# Patient Record
Sex: FEMALE | Race: WHITE | URBAN - NONMETROPOLITAN AREA
[De-identification: names, ages, dates, MRNs, and addresses within clinical notes are randomized per-mention and may not be internally consistent; named-entity substitution may affect disease eponyms.]

---
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
## 2019-05-06 LAB
AVERAGE GLUCOSE: NORMAL
HBA1C MFR BLD: 8.6 %

## 2019-08-05 LAB
AVERAGE GLUCOSE: NORMAL
HBA1C MFR BLD: 8.2 %

## 2019-12-27 LAB
AVERAGE GLUCOSE: NORMAL
CREATININE URINE: 153 MG/DL
HBA1C MFR BLD: 8.4 %
MICROALBUMIN/CREAT 24H UR: 49 MG/G{CREAT}

## 2019-12-30 LAB
CHOLESTEROL, TOTAL: 198 MG/DL
CHOLESTEROL/HDL RATIO: 3.1
HDLC SERPL-MCNC: 64 MG/DL (ref 35–70)
LDL CHOLESTEROL CALCULATED: 120 MG/DL (ref 0–160)
NONHDLC SERPL-MCNC: 134 MG/DL
TRIGL SERPL-MCNC: 50 MG/DL
VLDLC SERPL CALC-MCNC: NORMAL MG/DL

## 2020-02-07 LAB
AVERAGE GLUCOSE: NORMAL
HBA1C MFR BLD: 8.8 %

## 2020-02-11 LAB — DIABETIC RETINOPATHY: NEGATIVE

## 2020-08-17 LAB
AVERAGE GLUCOSE: NORMAL
HBA1C MFR BLD: 7.2 %

## 2020-12-30 LAB
AVERAGE GLUCOSE: NORMAL
HBA1C MFR BLD: 7 %

## 2021-07-01 ENCOUNTER — OFFICE VISIT (OUTPATIENT)
Dept: DIABETES SERVICES | Age: 21
End: 2021-07-01
Payer: COMMERCIAL

## 2021-07-01 VITALS
SYSTOLIC BLOOD PRESSURE: 98 MMHG | HEART RATE: 94 BPM | WEIGHT: 154.8 LBS | BODY MASS INDEX: 23.46 KG/M2 | DIASTOLIC BLOOD PRESSURE: 68 MMHG | HEIGHT: 68 IN | RESPIRATION RATE: 16 BRPM

## 2021-07-01 DIAGNOSIS — E10.9 TYPE 1 DIABETES MELLITUS WITHOUT COMPLICATION (HCC): Primary | ICD-10-CM

## 2021-07-01 PROCEDURE — 99205 OFFICE O/P NEW HI 60 MIN: CPT | Performed by: NURSE PRACTITIONER

## 2021-07-01 PROCEDURE — 95251 CONT GLUC MNTR ANALYSIS I&R: CPT | Performed by: NURSE PRACTITIONER

## 2021-07-01 RX ORDER — INSULIN PUMP CONTROLLER
EACH MISCELLANEOUS
Qty: 6 EACH | Refills: 3 | Status: SHIPPED | OUTPATIENT
Start: 2021-07-01 | End: 2022-01-10 | Stop reason: SDUPTHER

## 2021-07-01 ASSESSMENT — ENCOUNTER SYMPTOMS
RESPIRATORY NEGATIVE: 1
ABDOMINAL PAIN: 0
SHORTNESS OF BREATH: 0
DIARRHEA: 0

## 2021-07-01 NOTE — PROGRESS NOTES
medications for this visit. Review ofSymptoms:  Review of Systems   Constitutional: Positive for fatigue. Negative for unexpected weight change. Eyes: Negative for visual disturbance. Respiratory: Negative. Negative for shortness of breath. Cardiovascular: Negative for chest pain and leg swelling. Gastrointestinal: Negative for abdominal pain and diarrhea. Endocrine: Negative for polydipsia, polyphagia and polyuria. Genitourinary: Negative. Musculoskeletal: Negative. Skin: Negative for rash and wound. Neurological: Negative for dizziness, tremors, seizures and headaches. Psychiatric/Behavioral: Negative. Negative for confusion and decreased concentration. Theremainder of a complete 14-point review of systems is negative. Vital Signs: BP 98/68 (Site: Left Upper Arm, Position: Sitting, Cuff Size: Large Adult)   Pulse 94   Resp 16   Ht 5' 8\" (1.727 m)   Wt 154 lb 12.8 oz (70.2 kg)   LMP 06/17/2021   Breastfeeding No   BMI 23.54 kg/m²      Wt Readings from Last 3 Encounters:   07/01/21 154 lb 12.8 oz (70.2 kg)     Body mass index is 23.54 kg/m². LABS:  No results found for: LABA1C  No results found for: LABMICR, [id number]  No results found for: NA, K, CL, CO2, BUN, CREATININE, GLUCOSE, CALCIUM, PROT, LABALBU, BILITOT, ALKPHOS, AST, ALT, LABGLOM, GFRAA, AGRATIO, GLOB  No results found for: CHOL  No results found for: TRIG  No results found for: HDL  No results found for: LDLCHOLESTEROL, LDLCALC  No results found for: LABVLDL, VLDL  No results found for: CHOLHDLRATIO        Physical Exam  Constitutional:       Appearance: She is well-developed. Eyes:      Pupils: Pupils are equal, round, and reactive to light. Neck:      Thyroid: No thyroid mass, thyromegaly or thyroid tenderness. Cardiovascular:      Rate and Rhythm: Normal rate and regular rhythm. Heart sounds: Normal heart sounds.    Pulmonary:      Effort: Pulmonary effort is normal.      Breath sounds: Normal breath sounds. Abdominal:      General: Bowel sounds are normal.      Palpations: Abdomen is soft. Skin:     General: Skin is warm and dry. Comments: Negative for open/nonhealing wounds. Negative for lipohypertrophy. Neurological:      Mental Status: She is alert and oriented to person, place, and time. ASSESSMENT/PLAN:     Diagnosis Orders   1. Type 1 diabetes mellitus without complication (HCC)  Vitamin D 25 Hydroxy    Comprehensive Metabolic Panel    CBC Auto Differential    Hemoglobin A1C    Lipid Panel    Microalbumin, Ur    Lipid, Fasting     Orders Placed This Encounter   Procedures    Vitamin D 25 Hydroxy    Comprehensive Metabolic Panel    CBC Auto Differential    Hemoglobin A1C    Lipid Panel    Microalbumin, Ur    Lipid, Fasting     Orders Placed This Encounter   Medications    insulin aspart (NOVOLOG) 100 UNIT/ML injection vial     Sig: Per omnipod     Dispense:  60 mL     Refill:  3    Insulin Disposable Pump (OMNIPOD DASH 5 PACK PODS) MISC     Sig: For use with omnipod     Dispense:  6 each     Refill:  3     Requested Prescriptions     Signed Prescriptions Disp Refills    insulin aspart (NOVOLOG) 100 UNIT/ML injection vial 60 mL 3     Sig: Per omnipod    Insulin Disposable Pump (OMNIPOD DASH 5 PACK PODS) MISC 6 each 3     Sig: For use with omnipod       1. Type 1 diabetes mellitus without complication (HCC)  - Unstable  HbA1C goal is less than 7%. - Fasting blood glucose goal is 70-130mg/dl and postprandial blood sugar goal is less than 180 mg/dl. -Diabetic foot exam up-to-date: No  -Diabetic retinal exam up-to-date: Yes  - Labs reviewed includes: Most recent A1C 7.0%, Microalb/Crt. Ratio 0 mcg/mg creat and GFR >60 mL/min. Repeat labs due in 2 weeks.    -We discussed in great detail dietary modifications they can make to better improve their blood sugars. --Initial diabetic education completed.  Discussed diabetes as a disease and how we can manage it to prevent complications associated with it. Insulin pump settings downloaded and reviewed. Scanned into media tab. CGM report downloaded and reviewed, scanned to media tab. Time in range 7.6% and hypoglycemia 3%. Predicted A1c per CGM report 7.6%. -she will work on compliance with her meal bolus   Insulin Instructions  Pump Settings   insulin aspart 100 UNIT/ML injection vial (NOVOLOG)   Last edited by DESMOND Tirado - CNP on 7/1/2021 at 10:47 AM      Basal Rate   Total Basal Dose: 20.1 units/day   Time units/hr   12:00 AM 0.8   11:00 AM 0.9    8:00 PM 0.8      Blood Glucose Target   Time mg/dL   12:00  - 100      Sensitivity Factor   Time mg/dL/unit   12:00 AM 50      Carb Ratio   Time g/unit   12:00 AM 9       Patient Instructions   Primary care doc   Gyn   Look into medicaid   Upgrade to dash   Use bolus function   Labs due now         Discussed signs and symptoms of hyper/hypoglycemia and how to treat. Encouraged 150 minutes of physical activity per week. Follow a low carbohydrate diet. Encouraged at least 7 hours of sleep. The patient was informed of the goals of diabetes management. This can only be accomplished by watching their diet and exercise levels. We certainly use medicines to help attain these goals. The consequences of not controlling blood sugars were discussed. These include blindness, heart disease, stroke, kidney disease, and possibly need for dialysis. They were told to be careful with their foot care as diabetics often have nerve damage, infections and risk for limb amutations . They also need a dilated eye exam yearly. We discussed the issues of diet, exercise, medication, complication avoidance, reviewed the signs and symptoms of diabetes, hypoglycemic episodes, significance of HbA1C.       - Vitamin D 25 Hydroxy; Future  - Comprehensive Metabolic Panel; Future  - CBC Auto Differential; Future  - Hemoglobin A1C; Future  - Lipid Panel;  Future  - Microalbumin, Ur; Future  - Lipid, Fasting; Future        Answered all patient questions. Agrees to follow plan of care and to follow up in 1 months, sooner if needed. Call office if unexplained blood sugars less than 70 occur or above 400. Call office or access MyChart with any further questions or concerns. Be sure to bring glucometer/food log at next appointment. Total time spent reviewing chart, labs, counseling patient and documenting on the date of the encounter: 60 min.       Electronically signed by DESMOND Spencer CNP on 7/1/2021 at 10:53 AM      (Please note that portions of this note were completed with a voice-recognition program. Efforts were made to edit the dictation but occasionally words are mis-transcribed.)

## 2021-07-30 ENCOUNTER — TELEPHONE (OUTPATIENT)
Dept: INTERNAL MEDICINE | Age: 21
End: 2021-07-30

## 2021-07-30 NOTE — TELEPHONE ENCOUNTER
Patient wanted documented as just an FYI, she is rescheduling appointment to push out one more month as she is finalizing moving and finishing up old insurance to new insurance.

## 2021-08-16 ENCOUNTER — TELEPHONE (OUTPATIENT)
Dept: INTERNAL MEDICINE | Age: 21
End: 2021-08-16

## 2021-08-16 NOTE — TELEPHONE ENCOUNTER
Spoke with patient and she states she would like to know if we can write a letter stating that it would be benefitial for her to have a support animal that can alert her when her blood sugars are going to high or too low. She is also going to see if her landlord has paperwork for us to fill out and if so she will fax it over to us. If agreeable, I will type up for patient and let her know when it is ready. Thank you.

## 2021-08-17 NOTE — TELEPHONE ENCOUNTER
I would be more than happy to compose a letter for therapy dog but this must be a licensed and trained dog with proper paper for documentation.

## 2021-08-17 NOTE — TELEPHONE ENCOUNTER
Patient thanked [address] and will get back with us if that situation does happen.  At this time, she does not have this animal.

## 2021-09-08 ENCOUNTER — HOSPITAL ENCOUNTER (OUTPATIENT)
Dept: LAB | Age: 21
Discharge: HOME | End: 2021-09-08
Payer: COMMERCIAL

## 2021-09-08 DIAGNOSIS — E10.9 TYPE 1 DIABETES MELLITUS WITHOUT COMPLICATION (HCC): ICD-10-CM

## 2021-09-08 LAB
ABSOLUTE EOS #: 0.1 K/UL (ref 0–0.44)
ABSOLUTE IMMATURE GRANULOCYTE: <0.03 K/UL (ref 0–0.3)
ABSOLUTE LYMPH #: 2.47 K/UL (ref 1.1–3.7)
ABSOLUTE MONO #: 0.32 K/UL (ref 0.1–1.2)
ALBUMIN SERPL-MCNC: 4.5 G/DL (ref 3.5–5.2)
ALBUMIN/GLOBULIN RATIO: 1.7 (ref 1–2.5)
ALP BLD-CCNC: 19 U/L (ref 35–104)
ALT SERPL-CCNC: 15 U/L (ref 5–33)
ANION GAP SERPL CALCULATED.3IONS-SCNC: 12 MMOL/L (ref 9–17)
AST SERPL-CCNC: 20 U/L
BASOPHILS # BLD: 1 % (ref 0–2)
BASOPHILS ABSOLUTE: 0.04 K/UL (ref 0–0.2)
BILIRUB SERPL-MCNC: 0.4 MG/DL (ref 0.3–1.2)
BUN BLDV-MCNC: 16 MG/DL (ref 6–20)
BUN/CREAT BLD: 24 (ref 9–20)
CALCIUM SERPL-MCNC: 9.2 MG/DL (ref 8.6–10.4)
CHLORIDE BLD-SCNC: 102 MMOL/L (ref 98–107)
CHOLESTEROL/HDL RATIO: 2.6
CHOLESTEROL: 199 MG/DL
CO2: 24 MMOL/L (ref 20–31)
CREAT SERPL-MCNC: 0.66 MG/DL (ref 0.5–0.9)
CREATININE URINE: 172.2 MG/DL (ref 28–217)
DIFFERENTIAL TYPE: ABNORMAL
EOSINOPHILS RELATIVE PERCENT: 2 % (ref 1–4)
ESTIMATED AVERAGE GLUCOSE: 177 MG/DL
GFR AFRICAN AMERICAN: >60 ML/MIN
GFR NON-AFRICAN AMERICAN: >60 ML/MIN
GFR SERPL CREATININE-BSD FRML MDRD: ABNORMAL ML/MIN/{1.73_M2}
GFR SERPL CREATININE-BSD FRML MDRD: ABNORMAL ML/MIN/{1.73_M2}
GLUCOSE BLD-MCNC: 81 MG/DL (ref 70–99)
HBA1C MFR BLD: 7.8 % (ref 4–6)
HCT VFR BLD CALC: 39.2 % (ref 36.3–47.1)
HDLC SERPL-MCNC: 76 MG/DL
HEMOGLOBIN: 13.4 G/DL (ref 11.9–15.1)
IMMATURE GRANULOCYTES: 0 %
LDL CHOLESTEROL: 113 MG/DL (ref 0–130)
LYMPHOCYTES # BLD: 51 % (ref 24–43)
MCH RBC QN AUTO: 31.2 PG (ref 25.2–33.5)
MCHC RBC AUTO-ENTMCNC: 34.2 G/DL (ref 25.2–33.5)
MCV RBC AUTO: 91.4 FL (ref 82.6–102.9)
MICROALBUMIN/CREAT 24H UR: 29 MG/L
MICROALBUMIN/CREAT UR-RTO: 17 MCG/MG CREAT
MONOCYTES # BLD: 7 % (ref 3–12)
NRBC AUTOMATED: 0 PER 100 WBC
PDW BLD-RTO: 11.5 % (ref 11.8–14.4)
PLATELET # BLD: 171 K/UL (ref 138–453)
PLATELET ESTIMATE: ABNORMAL
PMV BLD AUTO: 10.4 FL (ref 8.1–13.5)
POTASSIUM SERPL-SCNC: 4.2 MMOL/L (ref 3.7–5.3)
RBC # BLD: 4.29 M/UL (ref 3.95–5.11)
RBC # BLD: ABNORMAL 10*6/UL
SEG NEUTROPHILS: 39 % (ref 36–65)
SEGMENTED NEUTROPHILS ABSOLUTE COUNT: 1.88 K/UL (ref 1.5–8.1)
SODIUM BLD-SCNC: 138 MMOL/L (ref 135–144)
TOTAL PROTEIN: 7.2 G/DL (ref 6.4–8.3)
TRIGL SERPL-MCNC: 50 MG/DL
VITAMIN D 25-HYDROXY: 26.8 NG/ML (ref 30–100)
VLDLC SERPL CALC-MCNC: NORMAL MG/DL (ref 1–30)
WBC # BLD: 4.8 K/UL (ref 3.5–11.3)
WBC # BLD: ABNORMAL 10*3/UL

## 2021-09-08 PROCEDURE — 82043 UR ALBUMIN QUANTITATIVE: CPT

## 2021-09-08 PROCEDURE — 82306 VITAMIN D 25 HYDROXY: CPT

## 2021-09-08 PROCEDURE — 80053 COMPREHEN METABOLIC PANEL: CPT

## 2021-09-08 PROCEDURE — 80061 LIPID PANEL: CPT

## 2021-09-08 PROCEDURE — 36415 COLL VENOUS BLD VENIPUNCTURE: CPT

## 2021-09-08 PROCEDURE — 82570 ASSAY OF URINE CREATININE: CPT

## 2021-09-08 PROCEDURE — 85025 COMPLETE CBC W/AUTO DIFF WBC: CPT

## 2021-09-08 PROCEDURE — 83036 HEMOGLOBIN GLYCOSYLATED A1C: CPT

## 2021-09-09 ENCOUNTER — OFFICE VISIT (OUTPATIENT)
Dept: DIABETES SERVICES | Age: 21
End: 2021-09-09
Payer: COMMERCIAL

## 2021-09-09 VITALS
DIASTOLIC BLOOD PRESSURE: 88 MMHG | SYSTOLIC BLOOD PRESSURE: 128 MMHG | WEIGHT: 156 LBS | BODY MASS INDEX: 23.64 KG/M2 | RESPIRATION RATE: 14 BRPM | HEART RATE: 78 BPM | HEIGHT: 68 IN

## 2021-09-09 DIAGNOSIS — E10.9 TYPE 1 DIABETES MELLITUS WITHOUT COMPLICATION (HCC): Primary | ICD-10-CM

## 2021-09-09 PROCEDURE — 95251 CONT GLUC MNTR ANALYSIS I&R: CPT | Performed by: NURSE PRACTITIONER

## 2021-09-09 PROCEDURE — 99214 OFFICE O/P EST MOD 30 MIN: CPT | Performed by: NURSE PRACTITIONER

## 2021-09-09 PROCEDURE — 3051F HG A1C>EQUAL 7.0%<8.0%: CPT | Performed by: NURSE PRACTITIONER

## 2021-09-09 ASSESSMENT — ENCOUNTER SYMPTOMS
RESPIRATORY NEGATIVE: 1
SHORTNESS OF BREATH: 0
DIARRHEA: 0
ABDOMINAL PAIN: 0

## 2021-09-09 NOTE — PROGRESS NOTES
[facility] [address]  [facility]  [address] [facility], [address]  [place] [address] [facility]  [phone]        HISTORY:    Dwayne Estes presents today for evaluation and management of:  Chief Complaint   Patient presents with    Diabetes     2 month appt. HPI    Interval History:    Past DM Medications   MDIx1 year      Current Diabetic Medications  novolog for use with omnipod max daily dose 35 units.      DKA episodes: 0    07/01/21   She was previously seeing peds kristal in [place] and has moved to [facility] and needs to get established. She was diagnosed in 2014. She had polyuria, polydipsia weight loss did not have DKA started on MDI then to ompipod a year later with dexcom cgm. She does currently have the newest omnipod dash system but has not changed over yet. She needs refills on supplies and labs. most recent a1c done in December of 2020 7.0%. Denies any s/s of hyper/hypoglycemia. She does have a emergency glucagon kit at home has never needed to use it. Diet: counting carbs not very well  Exercise: walking  BS testing: Uses CGM daily with success and is adherent to cgm regimen. Issues: denies         09/09/21   At previous visit dm counseling was provided. She has upgraded to omnipod dash system. She has had labs completed. Diet: counting carbs   Exercise: walking  BS testing: Uses CGM daily with success and is adherent to cgm regimen.    Issues: denies            Past Medical History:   Diagnosis Date    Diabetes mellitus ([address]      Family History   Problem Relation Age of Onset    High Blood Pressure Paternal Grandmother     High Blood Pressure Paternal Grandfather      Social History     Tobacco Use    Smoking status: Never Smoker    Smokeless tobacco: Never Used   Vaping Use    Vaping Use: Never used   Substance Use Topics    Alcohol use: Never    Drug use: Never     No Known Allergies    MEDICATIONS:  Current Outpatient Medications   Medication Sig Dispense Refill    insulin aspart (NOVOLOG) 100 UNIT/ML injection vial Per omnipod 60 mL 3    Insulin Disposable Pump (OMNIPOD DASH 5 PACK PODS) MISC For use with omnipod 6 each 3    glucagon 1 MG injection Glucagon Emergency Kit 1 mg solution for injection (Patient not taking: Reported on 9/9/2021)       No current facility-administered medications for this visit. Review ofSymptoms:  Review of Systems   Constitutional: Positive for fatigue. Negative for unexpected weight change. Eyes: Negative for visual disturbance. Respiratory: Negative. Negative for shortness of breath. Cardiovascular: Negative for chest pain and leg swelling. Gastrointestinal: Negative for abdominal pain and diarrhea. Endocrine: Negative for polydipsia, polyphagia and polyuria. Genitourinary: Negative. Musculoskeletal: Negative. Skin: Negative for rash and wound. Neurological: Negative for dizziness, tremors, seizures and headaches. Psychiatric/Behavioral: Negative. Negative for confusion and decreased concentration. Theremainder of a complete 14-point review of systems is negative. Vital Signs: /88 (Site: Left Upper Arm, Position: Sitting, Cuff Size: Medium Adult)   Pulse 78   Resp 14   Ht 5' 8\" (1.727 m)   Wt 156 lb (70.8 kg)   LMP 09/05/2021   BMI 23.72 kg/m²      Wt Readings from Last 3 Encounters:   09/09/21 156 lb (70.8 kg)   07/01/21 154 lb 12.8 oz (70.2 kg)     Body mass index is 23.72 kg/m².   LABS:  Hemoglobin A1C   Date Value Ref Range Status   09/08/2021 7.8 (H) 4.0 - 6.0 % Final   12/30/2020 7.0 % Final     Lab Results   Component Value Date    LABMICR 17 09/08/2021     Lab Results   Component Value Date     09/08/2021    K 4.2 09/08/2021     09/08/2021    CO2 24 09/08/2021    BUN 16 09/08/2021    CREATININE 0.66 09/08/2021    GLUCOSE 81 09/08/2021    CALCIUM 9.2 09/08/2021    PROT 7.2 09/08/2021    LABALBU 4.5 09/08/2021    BILITOT 0.40 09/08/2021    ALKPHOS 19 (L) 09/08/2021    AST 20 09/08/2021    ALT 15 09/08/2021    LABGLOM >60 09/08/2021    GFRAA >60 09/08/2021     Lab Results   Component Value Date    CHOL 199 09/08/2021    CHOL 198 12/30/2019     Lab Results   Component Value Date    TRIG 50 09/08/2021    TRIG 50 12/30/2019     Lab Results   Component Value Date    HDL 76 09/08/2021    HDL 64 12/30/2019     Lab Results   Component Value Date    LDLCHOLESTEROL 113 09/08/2021    LDLCALC 120 12/30/2019     Lab Results   Component Value Date    VLDL NOT REPORTED 09/08/2021     Lab Results   Component Value Date    CHOLHDLRATIO 2.6 09/08/2021    CHOLHDLRATIO 3.1 12/30/2019           Physical Exam  Constitutional:       Appearance: She is well-developed. Eyes:      Pupils: Pupils are equal, round, and reactive to light. Neck:      Thyroid: No thyroid mass, thyromegaly or thyroid tenderness. Cardiovascular:      Rate and Rhythm: Normal rate and regular rhythm. Heart sounds: Normal heart sounds. Pulmonary:      Effort: Pulmonary effort is normal.      Breath sounds: Normal breath sounds. Abdominal:      General: Bowel sounds are normal.      Palpations: Abdomen is soft. Skin:     General: Skin is warm and dry. Comments: Negative for open/nonhealing wounds. Negative for lipohypertrophy. Neurological:      Mental Status: She is alert and oriented to person, place, and time. Diabetic foot check:Normal strength and range of motion of toes, feet, and ankles bilaterally. No joint deformity. No cyanosis or clubbing. 100% sensation at all 22 test points with the 10 gram filament. Dorsalis pedis pulses intactbilaterally. Capillary refill at the toes was less than 2 seconds. Vibratory sensation (with 128 Hz tuning fork) intact bilaterally. Light touch sensation intact bilaterally. Hair growth present on feet and toes bilaterally. No skin breakdown, erythema, rub spots, blisters, scaling, or ulcers. No calluses or corns.  Toenails thin and not ingrown. No evidence of fungal infection. ASSESSMENT/PLAN:     Diagnosis Orders   1. Type 1 diabetes mellitus without complication (HCC)  HM DIABETES FOOT EXAM    Hemoglobin A1C     Orders Placed This Encounter   Procedures    Hemoglobin A1C    HM DIABETES FOOT EXAM     No orders of the defined types were placed in this encounter. Requested Prescriptions      No prescriptions requested or ordered in this encounter       1. Type 1 diabetes mellitus without complication (HCC)  - Unstable  HbA1C goal is less than 7%. - Fasting blood glucose goal is 70-130mg/dl and postprandial blood sugar goal is less than 180 mg/dl. -Diabetic foot exam up-to-date: Yes  -Diabetic retinal exam up-to-date: Yes  - Labs reviewed includes: Most recent A1C 7.8%, Microalb/Crt. Ratio 17 mcg/mg creat and GFR >60 mL/min. Repeat labs due in 3 months.    -We discussed in great detail dietary modifications they can make to better improve their blood sugars. -follow up diabetes education completed, all questions answered. CGM report downloaded and reviewed, scanned to media tab. Time in range 38% and hypoglycemia 3%. Insulin pump settings downloaded and reviewed. Scanned into media tab. -no pump changes at this time reviewed compliance and using bolus with every meal and for corrections. reviewed labs and suggest otc vitamin d. Insulin Instructions  Pump Settings   insulin aspart 100 UNIT/ML injection vial (NOVOLOG)   Last edited by DESMOND Rivera CNP on 9/9/2021 at 2:25 PM      Basal Rate   Total Basal Dose: 20.1 units/day   Time units/hr   12:00 AM 0.8   11:00 AM 0.9    8:00 PM 0.8      Blood Glucose Target   Time mg/dL   12:00  - 120      Sensitivity Factor   Time mg/dL/unit   12:00 AM 50      Carb Ratio   Time g/unit   12:00 AM 9           Discussed signs and symptoms of hyper/hypoglycemia and how to treat. Encouraged 150 minutes of physical activity per week. Follow a low carbohydrate diet. Encouraged at least 7 hours of sleep. The patient was informed of the goals of diabetes management. This can only be accomplished by watching their diet and exercise levels. We certainly use medicines to help attain these goals. The consequences of not controlling blood sugars were discussed. These include blindness, heart disease, stroke, kidney disease, and possibly need for dialysis. They were told to be careful with their foot care as diabetics often have nerve damage, infections and risk for limb amutations . They also need a dilated eye exam yearly. We discussed the issues of diet, exercise, medication, complication avoidance, reviewed the signs and symptoms of diabetes, hypoglycemic episodes, significance of HbA1C.       -  DIABETES FOOT EXAM  - Hemoglobin A1C; Future        Answered all patient questions. Agrees to follow plan of care and to follow up in 3 months, sooner if needed. Call office if unexplained blood sugars less than 70 occur or above 400. Call office or access MyChart with any further questions or concerns. Be sure to bring glucometer/food log at next appointment. Total time spent reviewing chart, labs, counseling patient and documenting on the date of the encounter: 30 min.       Electronically signed by DESMOND River CNP on 9/9/2021 at 2:18 PM      (Please note that portions of this note were completed with a voice-recognition program. Efforts were made to edit the dictation but occasionally words are mis-transcribed.)

## 2021-12-08 ENCOUNTER — OFFICE VISIT (OUTPATIENT)
Dept: DIABETES SERVICES | Age: 21
End: 2021-12-08
Payer: COMMERCIAL

## 2021-12-08 VITALS
RESPIRATION RATE: 14 BRPM | SYSTOLIC BLOOD PRESSURE: 92 MMHG | HEIGHT: 68 IN | BODY MASS INDEX: 24.71 KG/M2 | WEIGHT: 163 LBS | HEART RATE: 64 BPM | DIASTOLIC BLOOD PRESSURE: 62 MMHG

## 2021-12-08 DIAGNOSIS — R79.89 LOW SERUM VITAMIN D: ICD-10-CM

## 2021-12-08 DIAGNOSIS — E10.9 TYPE 1 DIABETES MELLITUS WITHOUT COMPLICATION (HCC): Primary | ICD-10-CM

## 2021-12-08 PROCEDURE — 3051F HG A1C>EQUAL 7.0%<8.0%: CPT | Performed by: NURSE PRACTITIONER

## 2021-12-08 PROCEDURE — 99214 OFFICE O/P EST MOD 30 MIN: CPT | Performed by: NURSE PRACTITIONER

## 2021-12-08 PROCEDURE — 95251 CONT GLUC MNTR ANALYSIS I&R: CPT | Performed by: NURSE PRACTITIONER

## 2021-12-09 ASSESSMENT — ENCOUNTER SYMPTOMS
ABDOMINAL PAIN: 0
SHORTNESS OF BREATH: 0
RESPIRATORY NEGATIVE: 1
DIARRHEA: 0

## 2021-12-09 NOTE — PROGRESS NOTES
[facility] [address] INTERNAL  [facility]  [address] [facility], [address]  [place] [address] [facility]  [phone]        HISTORY:    Adam Kaufman presents today for evaluation and management of:  Chief Complaint   Patient presents with    Diabetes     3 month appt. HPI    Interval History:    Past DM Medications   MDIx1 year      Current Diabetic Medications  novolog for use with omnipod max daily dose 35 units.      DKA episodes: 0    07/01/21   She was previously seeing jose ramon giraldo in [place] and has moved to [facility] and needs to get established. She was diagnosed in 2014. She had polyuria, polydipsia weight loss did not have DKA started on MDI then to ompipod a year later with dexcom cgm. She does currently have the newest omnipod dash system but has not changed over yet. She needs refills on supplies and labs. most recent a1c done in December of 2020 7.0%.  Denies any s/s of hyper/hypoglycemia. She does have a emergency glucagon kit at home has never needed to use it. Diet: counting carbs not very well  Exercise: walking  BS testing: Uses CGM daily with success and is adherent to cgm regimen.   Issues: denies          09/09/21   At previous visit dm counseling was provided. She has upgraded to omnipod dash system. She has had labs completed. Diet: counting carbs   Exercise: walking  BS testing: Uses CGM daily with success and is adherent to cgm regimen.   Issues: denies      12/09/21   Adam Kaufman is a 24 y.o. female patient who presents to clinic today for follow up on her diabetes. At previous visit diabetes counseling was provided. she denies any signs or symptoms of hyper/hypoglycemia. she states they are taking their medications as prescribed without any adverse effects. She does admit to eating without taking a bolus or bolusing after meals.   She does have a history of low serum vitamin D level was recommended to restart over-the-counter vitamin D supplementation however she has not done this. Diet: Counting carbohydrates  Exercise: walking  BS testing: uses cgm daily with success and is adherent to cgm therapy  Issues: petra  Diabetic foot exam up-to-date: Yes  Diabetic retinal exam up-to-date: Yes  Hypoglycemia as needed treatment: Glucose tabs and emergency glucagon        Past Medical History:   Diagnosis Date    Diabetes mellitus ([address]      Family History   Problem Relation Age of Onset    High Blood Pressure Paternal Grandmother     High Blood Pressure Paternal Grandfather      Social History     Tobacco Use    Smoking status: Never Smoker    Smokeless tobacco: Never Used   Vaping Use    Vaping Use: Never used   Substance Use Topics    Alcohol use: Never    Drug use: Never     No Known Allergies    MEDICATIONS:  Current Outpatient Medications   Medication Sig Dispense Refill    insulin aspart (NOVOLOG) 100 UNIT/ML injection vial Per omnipod 60 mL 3    Insulin Disposable Pump (OMNIPOD DASH 5 PACK PODS) MISC For use with omnipod 6 each 3    glucagon 1 MG injection Glucagon Emergency Kit 1 mg solution for injection (Patient not taking: Reported on 9/9/2021)       No current facility-administered medications for this visit. Review ofSymptoms:  Review of Systems   Constitutional: Positive for fatigue. Negative for unexpected weight change. Eyes: Negative for visual disturbance. Respiratory: Negative. Negative for shortness of breath. Cardiovascular: Negative for chest pain and leg swelling. Gastrointestinal: Negative for abdominal pain and diarrhea. Endocrine: Negative for polydipsia, polyphagia and polyuria. Genitourinary: Negative. Musculoskeletal: Negative. Skin: Negative for rash and wound. Neurological: Negative for dizziness, tremors, seizures and headaches. Psychiatric/Behavioral: Negative. Negative for confusion and decreased concentration.      Theremainder of a complete 14-point review of systems is negative. Vital Signs: BP 92/62 (Site: Right Upper Arm, Position: Sitting, Cuff Size: Medium Adult)   Pulse 64   Resp 14   Ht 5' 8\" (1.727 m)   Wt 163 lb (73.9 kg)   BMI 24.78 kg/m²      Wt Readings from Last 3 Encounters:   12/08/21 163 lb (73.9 kg)   09/09/21 156 lb (70.8 kg)   07/01/21 154 lb 12.8 oz (70.2 kg)     Body mass index is 24.78 kg/m². LABS:  Hemoglobin A1C   Date Value Ref Range Status   09/08/2021 7.8 (H) 4.0 - 6.0 % Final   12/30/2020 7.0 % Final     Lab Results   Component Value Date    LABMICR 17 09/08/2021     Lab Results   Component Value Date     09/08/2021    K 4.2 09/08/2021     09/08/2021    CO2 24 09/08/2021    BUN 16 09/08/2021    CREATININE 0.66 09/08/2021    GLUCOSE 81 09/08/2021    CALCIUM 9.2 09/08/2021    PROT 7.2 09/08/2021    LABALBU 4.5 09/08/2021    BILITOT 0.40 09/08/2021    ALKPHOS 19 (L) 09/08/2021    AST 20 09/08/2021    ALT 15 09/08/2021    LABGLOM >60 09/08/2021    GFRAA >60 09/08/2021     Lab Results   Component Value Date    CHOL 199 09/08/2021    CHOL 198 12/30/2019     Lab Results   Component Value Date    TRIG 50 09/08/2021    TRIG 50 12/30/2019     Lab Results   Component Value Date    HDL 76 09/08/2021    HDL 64 12/30/2019     Lab Results   Component Value Date    LDLCHOLESTEROL 113 09/08/2021    LDLCALC 120 12/30/2019     Lab Results   Component Value Date    VLDL NOT REPORTED 09/08/2021     Lab Results   Component Value Date    CHOLHDLRATIO 2.6 09/08/2021    CHOLHDLRATIO 3.1 12/30/2019           Physical Exam  Constitutional:       Appearance: She is well-developed. Eyes:      Pupils: Pupils are equal, round, and reactive to light. Neck:      Thyroid: No thyroid mass, thyromegaly or thyroid tenderness. Cardiovascular:      Rate and Rhythm: Normal rate and regular rhythm. Heart sounds: Normal heart sounds. Pulmonary:      Effort: Pulmonary effort is normal.      Breath sounds: Normal breath sounds.    Abdominal: General: Bowel sounds are normal.      Palpations: Abdomen is soft. Skin:     General: Skin is warm and dry. Comments: Negative for open/nonhealing wounds. Negative for lipohypertrophy. Neurological:      Mental Status: She is alert and oriented to person, place, and time. ASSESSMENT/PLAN:     Diagnosis Orders   1. Type 1 diabetes mellitus without complication (HCC)  Basic Metabolic Panel    Hemoglobin A1C   2. Low serum vitamin D  Vitamin D 25 Hydroxy     Orders Placed This Encounter   Procedures    Vitamin D 25 Hydroxy    Basic Metabolic Panel    Hemoglobin A1C     No orders of the defined types were placed in this encounter. Requested Prescriptions      No prescriptions requested or ordered in this encounter       1. Type 1 diabetes mellitus without complication (HCC)  - Unstable  HbA1C goal is less than 7%. - Fasting blood glucose goal is 70-120mg/dl and postprandial blood sugar goal is less than 180 mg/dl. - Labs reviewed including most recent A1c, microalbumin and kidney function. Repeat labs due in 3 months.    -We discussed in great detail dietary modifications they can make to better improve their blood sugars. -follow up diabetes education completed, all questions answered. CGM report downloaded and reviewed, scanned to media tab. Time in range 34% and hypoglycemia 4%. Predicted A1c per CGM report 8.5%. -Discussed with patient the importance of using bolus function appropriately.  -She'll work on bolusing before meals and using recommended bolus dosing. We will make slight adjustment in basal setting see below.   Insulin Instructions  Pump Settings   insulin aspart 100 UNIT/ML injection vial (NOVOLOG)   Last edited by DESMOND Melo - CNP on 12/8/2021 at 5:44 PM      Basal Rate   Total Basal Dose: 21.6 units/day   Time units/hr   12:00 AM 0.9   11:00 AM 0.9    8:00 PM 0.9      Blood Glucose Target   Time mg/dL   12:00  - 120      Sensitivity Factor   Time mg/dL/unit   12:00 AM 50      Carb Ratio   Time g/unit   12:00 AM 9         Discussed signs and symptoms of hyper/hypoglycemia and how to treat. Encouraged 150 minutes of physical activity per week. Follow a low carbohydrate diet. Encouraged at least 7 hours of sleep. The patient was informed of the goals of diabetes management. This can only be accomplished by watching their diet and exercise levels. We certainly use medicines to help attain these goals. The consequences of not controlling blood sugars were discussed. These include blindness, heart disease, stroke, kidney disease, and possibly need for dialysis. They were told to be careful with their foot care as diabetics often have nerve damage, infections and risk for limb amutations . They also need a dilated eye exam yearly. We discussed the issues of diet, exercise, medication, complication avoidance, reviewed the signs and symptoms of diabetes, hypoglycemic episodes, significance of HbA1C.       - Basic Metabolic Panel; Future  - Hemoglobin A1C; Future    2. Low serum vitamin D  -start over-the-counter vitamin D supplement  - Vitamin D 25 Hydroxy; Future        Answered all patient questions. Agrees to follow plan of care and to follow up in 3 months, sooner if needed. Call office if unexplained blood sugars less than 70 occur or above 400. Call office or access MyChart with any further questions or concerns. Be sure to bring glucometer/food log at next appointment. Total time spent reviewing chart, labs, counseling patient and documenting on the date of the encounter: 30 min.       Electronically signed by DESMOND Yepez CNP on 12/9/2021 at 12:35 PM      (Please note that portions of this note were completed with a voice-recognition program. Efforts were made to edit the dictation but occasionally words are mis-transcribed.)

## 2022-01-10 RX ORDER — INSULIN PUMP CONTROLLER
EACH MISCELLANEOUS
Qty: 6 EACH | Refills: 3 | Status: SHIPPED | OUTPATIENT
Start: 2022-01-10 | End: 2022-04-10 | Stop reason: SDUPTHER

## 2022-03-02 ENCOUNTER — HOSPITAL ENCOUNTER (OUTPATIENT)
Dept: LAB | Age: 22
Discharge: HOME | End: 2022-03-02
Payer: COMMERCIAL

## 2022-03-02 DIAGNOSIS — R79.89 LOW SERUM VITAMIN D: ICD-10-CM

## 2022-03-02 DIAGNOSIS — E10.9 TYPE 1 DIABETES MELLITUS WITHOUT COMPLICATION (HCC): ICD-10-CM

## 2022-03-02 LAB
ANION GAP SERPL CALCULATED.3IONS-SCNC: 9 MMOL/L (ref 9–17)
BUN BLDV-MCNC: 11 MG/DL (ref 6–20)
BUN/CREAT BLD: 17 (ref 9–20)
CALCIUM SERPL-MCNC: 9.2 MG/DL (ref 8.6–10.4)
CHLORIDE BLD-SCNC: 103 MMOL/L (ref 98–107)
CHOLESTEROL, FASTING: 175 MG/DL
CHOLESTEROL/HDL RATIO: 2.2
CO2: 28 MMOL/L (ref 20–31)
CREAT SERPL-MCNC: 0.63 MG/DL (ref 0.5–0.9)
GFR AFRICAN AMERICAN: >60 ML/MIN
GFR NON-AFRICAN AMERICAN: >60 ML/MIN
GFR SERPL CREATININE-BSD FRML MDRD: NORMAL ML/MIN/{1.73_M2}
GLUCOSE BLD-MCNC: 70 MG/DL (ref 70–99)
HDLC SERPL-MCNC: 79 MG/DL
LDL CHOLESTEROL: 90 MG/DL (ref 0–130)
POTASSIUM SERPL-SCNC: 3.7 MMOL/L (ref 3.7–5.3)
SODIUM BLD-SCNC: 140 MMOL/L (ref 135–144)
TRIGLYCERIDE, FASTING: 32 MG/DL
VITAMIN D 25-HYDROXY: 22.9 NG/ML

## 2022-03-02 PROCEDURE — 83036 HEMOGLOBIN GLYCOSYLATED A1C: CPT

## 2022-03-02 PROCEDURE — 82306 VITAMIN D 25 HYDROXY: CPT

## 2022-03-02 PROCEDURE — 36415 COLL VENOUS BLD VENIPUNCTURE: CPT

## 2022-03-02 PROCEDURE — 80061 LIPID PANEL: CPT

## 2022-03-02 PROCEDURE — 80048 BASIC METABOLIC PNL TOTAL CA: CPT

## 2022-03-03 LAB
ESTIMATED AVERAGE GLUCOSE: 203 MG/DL
HBA1C MFR BLD: 8.7 % (ref 4–6)

## 2022-03-09 ENCOUNTER — OFFICE VISIT (OUTPATIENT)
Dept: DIABETES SERVICES | Age: 22
End: 2022-03-09
Payer: COMMERCIAL

## 2022-03-09 VITALS
DIASTOLIC BLOOD PRESSURE: 60 MMHG | WEIGHT: 162.4 LBS | SYSTOLIC BLOOD PRESSURE: 110 MMHG | BODY MASS INDEX: 24.61 KG/M2 | HEART RATE: 76 BPM | RESPIRATION RATE: 15 BRPM | HEIGHT: 68 IN

## 2022-03-09 DIAGNOSIS — E10.9 TYPE 1 DIABETES MELLITUS WITHOUT COMPLICATION (HCC): Primary | ICD-10-CM

## 2022-03-09 DIAGNOSIS — R79.89 LOW SERUM VITAMIN D: ICD-10-CM

## 2022-03-09 PROCEDURE — 3052F HG A1C>EQUAL 8.0%<EQUAL 9.0%: CPT | Performed by: NURSE PRACTITIONER

## 2022-03-09 PROCEDURE — 99214 OFFICE O/P EST MOD 30 MIN: CPT | Performed by: NURSE PRACTITIONER

## 2022-03-09 PROCEDURE — 99211 OFF/OP EST MAY X REQ PHY/QHP: CPT | Performed by: NURSE PRACTITIONER

## 2022-03-09 PROCEDURE — 95251 CONT GLUC MNTR ANALYSIS I&R: CPT | Performed by: NURSE PRACTITIONER

## 2022-03-09 ASSESSMENT — ENCOUNTER SYMPTOMS
DIARRHEA: 0
SHORTNESS OF BREATH: 0
RESPIRATORY NEGATIVE: 1
ABDOMINAL PAIN: 0

## 2022-03-09 NOTE — PROGRESS NOTES
[address] [facility] INTERNAL MED A DEPARTMENT OF [address]  [address] [facility], [address]  [facility] [address] [phone]        HISTORY:    Dom Parr presents today for evaluation and management of:  Chief Complaint   Patient presents with    Diabetes       HPI    Interval History:    Past DM Medications   MDIx1 year      Current Diabetic Medications  novolog for use with omnipod max daily dose 50 units.      DKA episodes: 0    07/01/21   She was previously seeing jose ramon giraldo in [place] and has moved to [facility] and needs to get established. She was diagnosed in 2014. She had polyuria, polydipsia weight loss did not have DKA started on MDI then to ompipod a year later with dexcom cgm. She does currently have the newest omnipod dash system but has not changed over yet. She needs refills on supplies and labs. most recent a1c done in December of 2020 7.0%.  Denies any s/s of hyper/hypoglycemia. She does have a emergency glucagon kit at home has never needed to use it. Diet: counting carbs not very well  Exercise: walking  BS testing: Uses CGM daily with success and is adherent to cgm regimen.   Issues: denies          09/09/21   At previous visit dm counseling was provided. She has upgraded to omnipod dash system. She has had labs completed.   Diet: counting carbs   Exercise: walking  BS testing: Uses CGM daily with success and is adherent to cgm regimen.   Issues: denies       12/09/21   Dom Parr is a 24 y.o. female patient who presents to clinic today for follow up on her diabetes. At previous visit diabetes counseling was provided. she denies any signs or symptoms of hyper/hypoglycemia. she states they are taking their medications as prescribed without any adverse effects. She does admit to eating without taking a bolus or bolusing after meals.   She does have a history of low serum vitamin D level was recommended to restart over-the-counter vitamin D supplementation however she has not done this. Diet: Counting carbohydrates  Exercise: walking  BS testing: uses cgm daily with success and is adherent to cgm therapy  Issues: petra  Diabetic foot exam up-to-date: Yes  Diabetic retinal exam up-to-date: Yes  Hypoglycemia as needed treatment: Glucose tabs and emergency glucagon    03/09/22   Mayra Staton is a 24 y.o. female patient who presents to clinic today for her diabetes. she has a history of missed doses which contributes to her diabetes. At previous visit diabetes counseling was provided. she denies any current signs or symptoms of hyper/hypoglycemia. she states they are taking their medications as prescribed without any adverse effects. Diet: Counting carbohydrates  Exercise: walking  BS testing: uses cgm daily with success and is adherent to cgm therapy  Issues: petra  Diabetic foot exam up-to-date: Yes  Diabetic retinal exam up-to-date: Yes  Hypoglycemia as needed treatment: Glucose tabs and emergency glucagon    Low vitamin D- she has not started vitamin d supplement yet. Past Medical History:   Diagnosis Date    Diabetes mellitus ([facility] [address]      Family History   Problem Relation Age of Onset    High Blood Pressure Paternal Grandmother     High Blood Pressure Paternal Grandfather      Social History     Tobacco Use    Smoking status: Never Smoker    Smokeless tobacco: Never Used   Vaping Use    Vaping Use: Never used   Substance Use Topics    Alcohol use: Never    Drug use: Never     No Known Allergies    MEDICATIONS:  Current Outpatient Medications   Medication Sig Dispense Refill    insulin aspart (NOVOLOG) 100 UNIT/ML injection vial Per omnipod 20 mL 3    Insulin Disposable Pump (OMNIPOD DASH 5 PACK PODS) MISC For use with omnipod 6 each 3    glucagon 1 MG injection Glucagon Emergency Kit 1 mg solution for injection       No current facility-administered medications for this visit.        Review ofSymptoms:  Review of Systems Constitutional: Positive for fatigue. Negative for unexpected weight change. Eyes: Negative for visual disturbance. Respiratory: Negative. Negative for shortness of breath. Cardiovascular: Negative for chest pain and leg swelling. Gastrointestinal: Negative for abdominal pain and diarrhea. Endocrine: Negative for polydipsia, polyphagia and polyuria. Genitourinary: Negative. Musculoskeletal: Negative. Skin: Negative for rash and wound. Neurological: Negative for dizziness, tremors, seizures and headaches. Psychiatric/Behavioral: Negative. Negative for confusion and decreased concentration. Theremainder of a complete 14-point review of systems is negative. Vital Signs: /60 (Site: Right Upper Arm, Position: Sitting, Cuff Size: Medium Adult)   Pulse 76   Resp 15   Ht 5' 8\" (1.727 m)   Wt 162 lb 6.4 oz (73.7 kg)   BMI 24.69 kg/m²      Wt Readings from Last 3 Encounters:   03/09/22 162 lb 6.4 oz (73.7 kg)   12/08/21 163 lb (73.9 kg)   09/09/21 156 lb (70.8 kg)     Body mass index is 24.69 kg/m².   LABS:  Hemoglobin A1C   Date Value Ref Range Status   03/02/2022 8.7 (H) 4.0 - 6.0 % Final   09/08/2021 7.8 (H) 4.0 - 6.0 % Final     Lab Results   Component Value Date    LABMICR 17 09/08/2021     Lab Results   Component Value Date     03/02/2022    K 3.7 03/02/2022     03/02/2022    CO2 28 03/02/2022    BUN 11 03/02/2022    CREATININE 0.63 03/02/2022    GLUCOSE 70 03/02/2022    CALCIUM 9.2 03/02/2022    PROT 7.2 09/08/2021    LABALBU 4.5 09/08/2021    BILITOT 0.40 09/08/2021    ALKPHOS 19 (L) 09/08/2021    AST 20 09/08/2021    ALT 15 09/08/2021    LABGLOM >60 03/02/2022    GFRAA >60 03/02/2022     Lab Results   Component Value Date    CHOL 199 09/08/2021    CHOL 198 12/30/2019     Lab Results   Component Value Date    TRIG 50 09/08/2021    TRIG 50 12/30/2019     Lab Results   Component Value Date    HDL 79 03/02/2022    HDL 76 09/08/2021    HDL 64 12/30/2019     Lab Results   Component Value Date    LDLCHOLESTEROL 90 03/02/2022    LDLCHOLESTEROL 113 09/08/2021    LDLCALC 120 12/30/2019     Lab Results   Component Value Date    VLDL NOT REPORTED 09/08/2021     Lab Results   Component Value Date    CHOLHDLRATIO 2.2 03/02/2022    CHOLHDLRATIO 2.6 09/08/2021    CHOLHDLRATIO 3.1 12/30/2019           Physical Exam  Constitutional:       Appearance: She is well-developed. Eyes:      Pupils: Pupils are equal, round, and reactive to light. Neck:      Thyroid: No thyroid mass, thyromegaly or thyroid tenderness. Cardiovascular:      Rate and Rhythm: Normal rate and regular rhythm. Heart sounds: Normal heart sounds. Pulmonary:      Effort: Pulmonary effort is normal.      Breath sounds: Normal breath sounds. Abdominal:      General: Bowel sounds are normal.      Palpations: Abdomen is soft. Skin:     General: Skin is warm and dry. Comments: Negative for open/nonhealing wounds. Negative for lipohypertrophy. Neurological:      Mental Status: She is alert and oriented to person, place, and time. ASSESSMENT/PLAN:     Diagnosis Orders   1. Type 1 diabetes mellitus without complication (HCC)  POCT glycosylated hemoglobin (Hb A1C)   2. Low serum vitamin D       Orders Placed This Encounter   Procedures    POCT glycosylated hemoglobin (Hb A1C)     Orders Placed This Encounter   Medications    insulin aspart (NOVOLOG) 100 UNIT/ML injection vial     Sig: Per omnipod     Dispense:  20 mL     Refill:  3     Requested Prescriptions     Signed Prescriptions Disp Refills    insulin aspart (NOVOLOG) 100 UNIT/ML injection vial 20 mL 3     Sig: Per omnipod       1. Type 1 diabetes mellitus without complication (HCC)  - Unstable  HbA1C goal is less than 7%. - Fasting blood glucose goal is 70-120mg/dl and postprandial blood sugar goal is less than 180 mg/dl. - Labs reviewed including most recent A1c, microalbumin and kidney function. Repeat labs due in 3 months. -We discussed in great detail dietary modifications they can make to better improve their blood sugars. -follow up diabetes education completed, all questions answered. CGM report downloaded and reviewed, scanned to media tab. Time in range 39% and hypoglycemia 0%. Predicted A1c per CGM report 8.2%. Insulin pump settings downloaded and reviewed. Scanned into media tab.   -basal adjusted, will enter in glucose with every bolus. Insulin Instructions  Pump Settings   insulin aspart 100 UNIT/ML injection vial (NOVOLOG)   Last edited by DESMOND Lazaro CNP on 3/9/2022 at 1:33 PM      Basal Rate   Total Basal Dose: 22.4 units/day   Time units/hr   12:00 AM 1    4:00 AM 0.9    8:00 PM 1      Blood Glucose Target   Time mg/dL   12:00  - 120      Sensitivity Factor   Time mg/dL/unit   12:00 AM 50      Carb Ratio   Time g/unit   12:00 AM 9         Discussed signs and symptoms of hyper/hypoglycemia and how to treat. Encouraged 150 minutes of physical activity per week. Follow a low carbohydrate diet. Encouraged at least 7 hours of sleep. The patient was informed of the goals of diabetes management. This can only be accomplished by watching their diet and exercise levels. We certainly use medicines to help attain these goals. The consequences of not controlling blood sugars were discussed. These include blindness, heart disease, stroke, kidney disease, and possibly need for dialysis. They were told to be careful with their foot care as diabetics often have nerve damage, infections and risk for limb amutations . They also need a dilated eye exam yearly. We discussed the issues of diet, exercise, medication, complication avoidance, reviewed the signs and symptoms of diabetes, hypoglycemic episodes, significance of HbA1C.       - POCT glycosylated hemoglobin (Hb A1C); Future    2. Low serum vitamin D  -OTC vitamin D supplement         Answered all patient questions.  Agrees to follow plan of care and to follow up in 3 months, sooner if needed. Call office if unexplained blood sugars less than 70 occur or above 400. Call office or access [email or web address] with any further questions or concerns. Be sure to bring glucometer/food log at next appointment. Total time spent reviewing chart, labs, counseling patient and documenting on the date of the encounter: 30 min.       Electronically signed by DESMOND Gonzalez CNP on 3/9/2022 at 1:36 PM      (Please note that portions of this note were completed with a voice-recognition program. Efforts were made to edit the dictation but occasionally words are mis-transcribed.)

## 2022-04-07 LAB — DIABETIC RETINOPATHY: NEGATIVE

## 2022-04-11 RX ORDER — INSULIN PUMP CONTROLLER
EACH MISCELLANEOUS
Qty: 6 EACH | Refills: 3 | Status: SHIPPED | OUTPATIENT
Start: 2022-04-11 | End: 2022-09-12 | Stop reason: ALTCHOICE

## 2022-06-08 ENCOUNTER — TELEPHONE (OUTPATIENT)
Dept: INTERNAL MEDICINE | Age: 22
End: 2022-06-08

## 2022-06-08 ENCOUNTER — OFFICE VISIT (OUTPATIENT)
Dept: DIABETES SERVICES | Age: 22
End: 2022-06-08
Payer: COMMERCIAL

## 2022-06-08 VITALS
DIASTOLIC BLOOD PRESSURE: 72 MMHG | RESPIRATION RATE: 14 BRPM | BODY MASS INDEX: 23.49 KG/M2 | HEIGHT: 68 IN | SYSTOLIC BLOOD PRESSURE: 102 MMHG | WEIGHT: 155 LBS | HEART RATE: 60 BPM

## 2022-06-08 DIAGNOSIS — E10.9 TYPE 1 DIABETES MELLITUS WITHOUT COMPLICATION (HCC): Primary | ICD-10-CM

## 2022-06-08 PROCEDURE — 99214 OFFICE O/P EST MOD 30 MIN: CPT | Performed by: NURSE PRACTITIONER

## 2022-06-08 PROCEDURE — 95251 CONT GLUC MNTR ANALYSIS I&R: CPT | Performed by: NURSE PRACTITIONER

## 2022-06-08 PROCEDURE — 3052F HG A1C>EQUAL 8.0%<EQUAL 9.0%: CPT | Performed by: NURSE PRACTITIONER

## 2022-06-08 RX ORDER — INSULIN PMP CART,AUT,G6/7,CNTR
1 EACH SUBCUTANEOUS
Qty: 1 KIT | Refills: 0 | Status: SHIPPED | OUTPATIENT
Start: 2022-06-08

## 2022-06-08 RX ORDER — INSULIN PMP CART,AUT,G6/7,CNTR
1 EACH SUBCUTANEOUS
Qty: 10 EACH | Refills: 5 | Status: SHIPPED | OUTPATIENT
Start: 2022-06-08 | End: 2022-09-12 | Stop reason: SDUPTHER

## 2022-06-08 RX ORDER — INSULIN ASPART 100 [IU]/ML
INJECTION, SOLUTION INTRAVENOUS; SUBCUTANEOUS
Qty: 20 ML | Refills: 3 | Status: SHIPPED | OUTPATIENT
Start: 2022-06-08 | End: 2022-08-10 | Stop reason: SDUPTHER

## 2022-06-08 RX ORDER — INSULIN ASPART 100 [IU]/ML
INJECTION, SOLUTION INTRAVENOUS; SUBCUTANEOUS
Qty: 20 ML | Refills: 3 | Status: SHIPPED | OUTPATIENT
Start: 2022-06-08 | End: 2022-06-08

## 2022-06-08 ASSESSMENT — PATIENT HEALTH QUESTIONNAIRE - PHQ9
SUM OF ALL RESPONSES TO PHQ QUESTIONS 1-9: 0
SUM OF ALL RESPONSES TO PHQ9 QUESTIONS 1 & 2: 0
SUM OF ALL RESPONSES TO PHQ QUESTIONS 1-9: 0
SUM OF ALL RESPONSES TO PHQ QUESTIONS 1-9: 0
1. LITTLE INTEREST OR PLEASURE IN DOING THINGS: 0
2. FEELING DOWN, DEPRESSED OR HOPELESS: 0
SUM OF ALL RESPONSES TO PHQ QUESTIONS 1-9: 0

## 2022-06-08 ASSESSMENT — ENCOUNTER SYMPTOMS
DIARRHEA: 0
SHORTNESS OF BREATH: 0
RESPIRATORY NEGATIVE: 1
ABDOMINAL PAIN: 0

## 2022-06-08 NOTE — TELEPHONE ENCOUNTER
CURLY vargas. Spoke with Brandon Lopez from [facility]. She stated that she is going to check benefits on patient and will be in contact with patient for any information. She will call/fax with what needed and once that has been completed with RX, they will move forward with process of approval on omnipod 5.

## 2022-06-08 NOTE — PROGRESS NOTES
[facility] P[place] [place] INTERNAL [facility] [address] [facility]  [address] [facility], [address]  [place] [address] [facility]  [phone]        HISTORY:    Everett Feliz presents today for evaluation and management of:  Chief Complaint   Patient presents with    Diabetes     3 month appt. Eye exam completed april 7th. HPI    Interval History:    Past DM Medications   MDIx1 year      Current Diabetic Medications  novolog for use with omnipod max daily dose 50 units.      DKA episodes: 0       03/09/22   Everett Feliz is a 24 y.o. female patient who presents to clinic today for her diabetes. she has a history of missed doses which contributes to her diabetes. At previous visit diabetes counseling was provided. she denies any current signs or symptoms of hyper/hypoglycemia. she states they are taking their medications as prescribed without any adverse effects. Diet: Counting carbohydrates  Exercise: walking  BS testing: uses cgm daily with success and is adherent to cgm therapy  Issues: deneis  Diabetic foot exam up-to-date: Yes  Diabetic retinal exam up-to-date: Yes  Hypoglycemia as needed treatment: Glucose tabs and emergency glucagon    06/08/22   Everett Feliz is a 16[address] [facility] y.o. female patient who presents to clinic today for her diabetes. she has a history of missed doses which contributes to her diabetes. At previous visit diabetes counseling was provided she denies any current signs or symptoms of hyper/hypoglycemia. she states they are taking their medications as prescribed without any adverse effects.    Diet: Counting carbohydrates  Exercise: walking  BS testing: uses cgm daily with success and is adherent to cgm therapy  Issues: deneis  Diabetic foot exam up-to-date: Yes  Diabetic retinal exam up-to-date: Yes  Hypoglycemia as needed treatment: Glucose tabs and emergency glucagon          Past Medical History:   Diagnosis Date    Diabetes mellitus ([address] Family History   Problem Relation Age of Onset    High Blood Pressure Paternal Grandmother     High Blood Pressure Paternal Grandfather      Social History     Tobacco Use    Smoking status: Never Smoker    Smokeless tobacco: Never Used   Vaping Use    Vaping Use: Never used   Substance Use Topics    Alcohol use: Never    Drug use: Never     No Known Allergies    MEDICATIONS:  Current Outpatient Medications   Medication Sig Dispense Refill    insulin aspart (NOVOLOG) 100 UNIT/ML injection vial For use with insulin pump max daily dose is 45 units 20 mL 3    Insulin Disposable Pump (OMNIPOD 5 G6 INTRO, GEN 5,) KIT 1 kit by Does not apply route every 3 days 1 kit 0    Insulin Disposable Pump (OMNIPOD 5 G6 POD, GEN 5,) MISC 1 Units by Does not apply route every 3 days 10 each 5    Insulin Disposable Pump (OMNIPOD DASH 5 PACK PODS) MISC For use with omnipod 6 each 3    insulin aspart (NOVOLOG) 100 UNIT/ML injection vial Per omnipod 20 mL 3    glucagon 1 MG injection Glucagon Emergency Kit 1 mg solution for injection       No current facility-administered medications for this visit. Review ofSymptoms:  Review of Systems   Constitutional: Positive for fatigue. Negative for unexpected weight change. Eyes: Negative for visual disturbance. Respiratory: Negative. Negative for shortness of breath. Cardiovascular: Negative for chest pain and leg swelling. Gastrointestinal: Negative for abdominal pain and diarrhea. Endocrine: Negative for polydipsia, polyphagia and polyuria. Genitourinary: Negative. Musculoskeletal: Negative. Skin: Negative for rash and wound. Neurological: Negative for dizziness, tremors, seizures and headaches. Psychiatric/Behavioral: Negative. Negative for confusion and decreased concentration. Theremainder of a complete 14-point review of systems is negative.        Vital Signs: /72 (Site: Right Upper Arm, Position: Sitting, Cuff Size: Medium Adult) Pulse 60   Resp 14   Ht 5' 8\" (1.727 m)   Wt 155 lb (70.3 kg)   LMP 06/05/2022   BMI 23.57 kg/m²      Wt Readings from Last 3 Encounters:   06/08/22 155 lb (70.3 kg)   03/09/22 162 lb 6.4 oz (73.7 kg)   12/08/21 163 lb (73.9 kg)     Body mass index is 23.57 kg/m². LABS:  Hemoglobin A1C   Date Value Ref Range Status   03/02/2022 8.7 (H) 4.0 - 6.0 % Final   09/08/2021 7.8 (H) 4.0 - 6.0 % Final     Lab Results   Component Value Date    LABMICR 17 09/08/2021     Lab Results   Component Value Date     03/02/2022    K 3.7 03/02/2022     03/02/2022    CO2 28 03/02/2022    BUN 11 03/02/2022    CREATININE 0.63 03/02/2022    GLUCOSE 70 03/02/2022    CALCIUM 9.2 03/02/2022    PROT 7.2 09/08/2021    LABALBU 4.5 09/08/2021    BILITOT 0.40 09/08/2021    ALKPHOS 19 (L) 09/08/2021    AST 20 09/08/2021    ALT 15 09/08/2021    LABGLOM >60 03/02/2022    GFRAA >60 03/02/2022     Lab Results   Component Value Date    CHOL 199 09/08/2021    CHOL 198 12/30/2019     Lab Results   Component Value Date    TRIG 50 09/08/2021    TRIG 50 12/30/2019     Lab Results   Component Value Date    HDL 79 03/02/2022    HDL 76 09/08/2021    HDL 64 12/30/2019     Lab Results   Component Value Date    LDLCHOLESTEROL 90 03/02/2022    LDLCHOLESTEROL 113 09/08/2021    LDLCALC 120 12/30/2019     Lab Results   Component Value Date    VLDL NOT REPORTED 09/08/2021     Lab Results   Component Value Date    CHOLHDLRATIO 2.2 03/02/2022    CHOLHDLRATIO 2.6 09/08/2021    CHOLHDLRATIO 3.1 12/30/2019           Physical Exam  Constitutional:       Appearance: She is well-developed. Eyes:      Pupils: Pupils are equal, round, and reactive to light. Neck:      Thyroid: No thyroid mass, thyromegaly or thyroid tenderness. Cardiovascular:      Rate and Rhythm: Normal rate and regular rhythm. Heart sounds: Normal heart sounds. Pulmonary:      Effort: Pulmonary effort is normal.      Breath sounds: Normal breath sounds.    Abdominal:      General: Bowel sounds are normal.      Palpations: Abdomen is soft. Skin:     General: Skin is warm and dry. Comments: Negative for open/nonhealing wounds. Negative for lipohypertrophy. Neurological:      Mental Status: She is alert and oriented to person, place, and time. ASSESSMENT/PLAN:     Diagnosis Orders   1. Type 1 diabetes mellitus without complication (HCC)  insulin aspart (NOVOLOG) 100 UNIT/ML injection vial    Insulin Disposable Pump (OMNIPOD 5 G6 INTRO, GEN 5,) KIT    Insulin Disposable Pump (OMNIPOD 5 G6 POD, GEN 5,) MISC     No orders of the defined types were placed in this encounter. Orders Placed This Encounter   Medications    insulin aspart (NOVOLOG) 100 UNIT/ML injection vial     Sig: For use with insulin pump max daily dose is 45 units     Dispense:  20 mL     Refill:  3    Insulin Disposable Pump (OMNIPOD 5 G6 INTRO, GEN 5,) KIT     Si kit by Does not apply route every 3 days     Dispense:  1 kit     Refill:  0    Insulin Disposable Pump (OMNIPOD 5 G6 POD, GEN 5,) MISC     Si Units by Does not apply route every 3 days     Dispense:  10 each     Refill:  5     Requested Prescriptions     Signed Prescriptions Disp Refills    insulin aspart (NOVOLOG) 100 UNIT/ML injection vial 20 mL 3     Sig: For use with insulin pump max daily dose is 45 units    Insulin Disposable Pump (OMNIPOD 5 G6 INTRO, GEN 5,) KIT 1 kit 0     Si kit by Does not apply route every 3 days    Insulin Disposable Pump (OMNIPOD 5 G6 POD, GEN 5,) MISC 10 each 5     Si Units by Does not apply route every 3 days       1. Type 1 diabetes mellitus without complication (HCC)  - Unstable  HbA1C goal is less than 7%. - Fasting blood glucose goal is 70-120mg/dl and postprandial blood sugar goal is less than 180 mg/dl. - Labs reviewed including most recent A1c, microalbumin and kidney function.  Repeat labs due in 3 months.    -We discussed in great detail dietary modifications they can make to better improve their blood sugars. -follow up diabetes education completed, all questions answered. CGM report downloaded and reviewed, scanned to media tab. Time in range 44% and hypoglycemia 0%. Predicted A1c per CGM report 8.0%. -work on compliance and upgrade to closed loop omnipod 5      Discussed signs and symptoms of hyper/hypoglycemia and how to treat. Encouraged 150 minutes of physical activity per week. Follow a low carbohydrate diet. Encouraged at least 7 hours of sleep. The patient was informed of the goals of diabetes management. This can only be accomplished by watching their diet and exercise levels. We certainly use medicines to help attain these goals. The consequences of not controlling blood sugars were discussed. These include blindness, heart disease, stroke, kidney disease, and possibly need for dialysis. They were told to be careful with their foot care as diabetics often have nerve damage, infections and risk for limb amutations . They also need a dilated eye exam yearly. We discussed the issues of diet, exercise, medication, complication avoidance, reviewed the signs and symptoms of diabetes, hypoglycemic episodes, significance of HbA1C.       - insulin aspart (NOVOLOG) 100 UNIT/ML injection vial; For use with insulin pump max daily dose is 45 units  Dispense: 20 mL; Refill: 3  - Insulin Disposable Pump (OMNIPOD 5 G6 INTRO, GEN 5,) KIT; 1 kit by Does not apply route every 3 days  Dispense: 1 kit; Refill: 0  - Insulin Disposable Pump (OMNIPOD 5 G6 POD, GEN 5,) MISC; 1 Units by Does not apply route every 3 days  Dispense: 10 each; Refill: 5        Answered all patient questions. Agrees to follow plan of care and to follow up in .3 months, sooner if needed. Call office if unexplained blood sugars less than 70 occur or above 400. Call office or access [email or web address] with any further questions or concerns. Be sure to bring glucometer/food log at next appointment.        Total time spent reviewing chart, labs, counseling patient and documenting on the date of the encounter: 30 min .      Electronically signed by DESMOND Diggs CNP on 6/8/2022 at 2:22 PM      (Please note that portions of this note were completed with a voice-recognition program. Efforts were made to edit the dictation but occasionally words are mis-transcribed.)

## 2022-08-10 RX ORDER — INSULIN ASPART 100 [IU]/ML
INJECTION, SOLUTION INTRAVENOUS; SUBCUTANEOUS
Qty: 20 ML | Refills: 5 | Status: SHIPPED | OUTPATIENT
Start: 2022-08-10 | End: 2022-10-31 | Stop reason: SDUPTHER

## 2022-09-12 ENCOUNTER — OFFICE VISIT (OUTPATIENT)
Dept: DIABETES SERVICES | Age: 22
End: 2022-09-12
Payer: COMMERCIAL

## 2022-09-12 VITALS
WEIGHT: 156 LBS | HEART RATE: 64 BPM | SYSTOLIC BLOOD PRESSURE: 82 MMHG | BODY MASS INDEX: 23.64 KG/M2 | DIASTOLIC BLOOD PRESSURE: 68 MMHG | HEIGHT: 68 IN | RESPIRATION RATE: 16 BRPM

## 2022-09-12 DIAGNOSIS — E10.9 TYPE 1 DIABETES MELLITUS WITHOUT COMPLICATION (HCC): ICD-10-CM

## 2022-09-12 PROCEDURE — 95251 CONT GLUC MNTR ANALYSIS I&R: CPT | Performed by: NURSE PRACTITIONER

## 2022-09-12 PROCEDURE — 3052F HG A1C>EQUAL 8.0%<EQUAL 9.0%: CPT | Performed by: NURSE PRACTITIONER

## 2022-09-12 PROCEDURE — 99214 OFFICE O/P EST MOD 30 MIN: CPT | Performed by: NURSE PRACTITIONER

## 2022-09-12 RX ORDER — INSULIN PMP CART,AUT,G6/7,CNTR
1 EACH SUBCUTANEOUS
Qty: 10 EACH | Refills: 5 | Status: SHIPPED | OUTPATIENT
Start: 2022-09-12 | End: 2022-10-24 | Stop reason: SDUPTHER

## 2022-09-12 ASSESSMENT — ENCOUNTER SYMPTOMS
SHORTNESS OF BREATH: 0
ABDOMINAL PAIN: 0
DIARRHEA: 0
RESPIRATORY NEGATIVE: 1

## 2022-09-12 NOTE — PROGRESS NOTES
[address] [facility] INTERNAL MED A DEPARTMENT OF [place] [address]  [address] [facility], [address] [address] [place] [id number] [phone]        HISTORY:    Darryn Zavala presents today for evaluation and management of:  Chief Complaint   Patient presents with    Diabetes    3 Month Follow-Up         Interval History:    Past DM Medications   MDIx1 year      Current Diabetic Medications  novolog for use with omnipod max daily dose 50 units. Office visit today is completed by Diabetes specialist NP, she has been using insulin pump (omnipod) for greater than 6 months with frequent changes in glucose and insulin levels. She is able to use omnipod safely and appropiratly. DKA episodes: 0    06/08/22   Darryn Zavala is a 20 y.o. female patient who presents to clinic today for her diabetes. she has a history of missed doses which contributes to her diabetes. At previous visit diabetes counseling was provided she denies any current signs or symptoms of hyper/hypoglycemia. she states they are taking their medications as prescribed without any adverse effects. Diet: Counting carbohydrates  Exercise: walking  BS testing: uses cgm daily with success and is adherent to cgm therapy  Issues: petra  Diabetic foot exam up-to-date: Yes  Diabetic retinal exam up-to-date: Yes  Hypoglycemia as needed treatment: Glucose tabs and emergency glucagon    09/12/22   Darryn Zavala is a 20 y.o. female patient who presents to clinic today for her diabetes. she has a history of missed doses which contributes to her diabetes. At previous visit she was upgraded to omnipod 5 closed loop and doing much better. she denies any current signs or symptoms of hyper/hypoglycemia. she states they are taking their medications as prescribed without any adverse effects.    Diet: Counting carbohydrates  Exercise: walking  BS testing: uses cgm daily with success and is adherent to cgm therapy  Issues: petra  Diabetic foot exam up-to-date: Yes  Diabetic retinal exam up-to-date: Yes  Hypoglycemia as needed treatment: Glucose tabs and emergency glucagon          Past Medical History:   Diagnosis Date    Diabetes mellitus ([address]      Family History   Problem Relation Age of Onset    High Blood Pressure Paternal Grandmother     High Blood Pressure Paternal Grandfather      Social History     Tobacco Use    Smoking status: Never    Smokeless tobacco: Never   Vaping Use    Vaping Use: Never used   Substance Use Topics    Alcohol use: Never    Drug use: Never     No Known Allergies    MEDICATIONS:  Current Outpatient Medications   Medication Sig Dispense Refill    Insulin Disposable Pump (OMNIPOD 5 G6 POD, GEN 5,) MISC 1 Units by Does not apply route every 3 days 10 each 5    insulin aspart (NOVOLOG) 100 UNIT/ML injection vial PER OMNIPOD. MAX DAILY DOSE OF 50 UNITS. 20 mL 5    Insulin Disposable Pump (OMNIPOD 5 G6 INTRO, GEN 5,) KIT 1 kit by Does not apply route every 3 days 1 kit 0    glucagon 1 MG injection Glucagon Emergency Kit 1 mg solution for injection       No current facility-administered medications for this visit. Review ofSymptoms:  Review of Systems   Constitutional:  Positive for fatigue. Negative for unexpected weight change. Eyes:  Negative for visual disturbance. Respiratory: Negative. Negative for shortness of breath. Cardiovascular:  Negative for chest pain and leg swelling. Gastrointestinal:  Negative for abdominal pain and diarrhea. Endocrine: Negative for polydipsia, polyphagia and polyuria. Genitourinary: Negative. Musculoskeletal: Negative. Skin:  Negative for rash and wound. Neurological:  Negative for dizziness, tremors, seizures and headaches. Psychiatric/Behavioral: Negative. Negative for confusion and decreased concentration. Theremainder of a complete 14-point review of systems is negative.        Vital Signs: BP 82/68 (Site: Right Upper Arm, Position: Sitting, Cuff Size: Medium Adult)   Pulse 64   Resp 16   Ht 5' 8\" (1.727 m)   Wt 156 lb (70.8 kg)   LMP 09/05/2022   BMI 23.72 kg/m²      Wt Readings from Last 3 Encounters:   09/12/22 156 lb (70.8 kg)   06/08/22 155 lb (70.3 kg)   03/09/22 162 lb 6.4 oz (73.7 kg)     Body mass index is 23.72 kg/m². LABS:  Hemoglobin A1C   Date Value Ref Range Status   03/02/2022 8.7 (H) 4.0 - 6.0 % Final   09/08/2021 7.8 (H) 4.0 - 6.0 % Final     Lab Results   Component Value Date    LABMICR 17 09/08/2021     Lab Results   Component Value Date     03/02/2022    K 3.7 03/02/2022     03/02/2022    CO2 28 03/02/2022    BUN 11 03/02/2022    CREATININE 0.63 03/02/2022    GLUCOSE 70 03/02/2022    CALCIUM 9.2 03/02/2022    PROT 7.2 09/08/2021    LABALBU 4.5 09/08/2021    BILITOT 0.40 09/08/2021    ALKPHOS 19 (L) 09/08/2021    AST 20 09/08/2021    ALT 15 09/08/2021    LABGLOM >60 03/02/2022    GFRAA >60 03/02/2022     Lab Results   Component Value Date    CHOL 199 09/08/2021    CHOL 198 12/30/2019     Lab Results   Component Value Date    TRIG 50 09/08/2021    TRIG 50 12/30/2019     Lab Results   Component Value Date    HDL 79 03/02/2022    HDL 76 09/08/2021    HDL 64 12/30/2019     Lab Results   Component Value Date    LDLCHOLESTEROL 90 03/02/2022    LDLCHOLESTEROL 113 09/08/2021    LDLCALC 120 12/30/2019     Lab Results   Component Value Date    VLDL NOT REPORTED 09/08/2021     Lab Results   Component Value Date    CHOLHDLRATIO 2.2 03/02/2022    CHOLHDLRATIO 2.6 09/08/2021    CHOLHDLRATIO 3.1 12/30/2019           Physical Exam  Constitutional:       Appearance: She is well-developed. Eyes:      Pupils: Pupils are equal, round, and reactive to light. Neck:      Thyroid: No thyroid mass, thyromegaly or thyroid tenderness. Cardiovascular:      Rate and Rhythm: Normal rate and regular rhythm. Heart sounds: Normal heart sounds.    Pulmonary:      Effort: Pulmonary effort is normal.      Breath sounds: Normal breath sounds. Abdominal:      General: Bowel sounds are normal.      Palpations: Abdomen is soft. Skin:     General: Skin is warm and dry. Comments: Negative for open/nonhealing wounds. Negative for lipohypertrophy. Neurological:      Mental Status: She is alert and oriented to person, place, and time. Diabetic foot exam:   Left Foot:   Visual Exam: normal   Pulse DP: 2+ (normal)   Filament test: normal sensation   Vibratory Sensation: normal  Right Foot:   Visual Exam: normal   Pulse DP: 2+ (normal)   Filament test: normal sensation   Vibratory Sensation: normal     ASSESSMENT/PLAN:     Diagnosis Orders   1. Type 1 diabetes mellitus without complication (HCC)  Insulin Disposable Pump (OMNIPOD 5 G6 POD, GEN 5,) MISC    Microalbumin, Ur    HM DIABETES FOOT EXAM        Orders Placed This Encounter   Procedures    Microalbumin, Ur    HM DIABETES FOOT EXAM     Orders Placed This Encounter   Medications    Insulin Disposable Pump (OMNIPOD 5 G6 POD, GEN 5,) MISC     Si Units by Does not apply route every 3 days     Dispense:  10 each     Refill:  5     Requested Prescriptions     Signed Prescriptions Disp Refills    Insulin Disposable Pump (OMNIPOD 5 G6 POD, GEN 5,) MISC 10 each 5     Si Units by Does not apply route every 3 days       1. Type 1 diabetes mellitus without complication (HCC)  - Unstable  HbA1C goal is less than 7%. - Fasting blood glucose goal is 70-120mg/dl and postprandial blood sugar goal is less than 180 mg/dl. - Labs reviewed including most recent A1c, microalbumin and kidney function. Repeat labs due in 3 months.    -We discussed in great detail dietary modifications they can make to better improve their blood sugars. -follow up diabetes education completed, all questions answered. CGM report downloaded and reviewed, scanned to media tab. Time in range 56% and hypoglycemia 7%. Average glucose 163 mg/dl. Insulin pump settings downloaded and reviewed.  Scanned into media tab. -increased max basal rate to allow automode continuation. Discussed signs and symptoms of hyper/hypoglycemia and how to treat. Encouraged 150 minutes of physical activity per week. Follow a low carbohydrate diet. Encouraged at least 7 hours of sleep. The patient was informed of the goals of diabetes management. This can only be accomplished by watching their diet and exercise levels. We certainly use medicines to help attain these goals. The consequences of not controlling blood sugars were discussed. These include blindness, heart disease, stroke, kidney disease, and possibly need for dialysis. They were told to be careful with their foot care as diabetics often have nerve damage, infections and risk for limb amutations . They also need a dilated eye exam yearly. We discussed the issues of diet, exercise, medication, complication avoidance, reviewed the signs and symptoms of diabetes, hypoglycemic episodes, significance of HbA1C.     - Insulin Disposable Pump (OMNIPOD 5 G6 POD, GEN 5,) MISC; 1 Units by Does not apply route every 3 days  Dispense: 10 each; Refill: 5  - Microalbumin, Ur; Future  -  DIABETES FOOT EXAM        Answered all patient questions. Agrees to follow plan of care and to follow up in 3 months, sooner if needed. Call office if unexplained blood sugars less than 70 occur or above 400. Call office or access [facility] with any further questions or concerns. Be sure to bring glucometer/food log at next appointment. Total time spent reviewing chart, labs, counseling patient and documenting on the date of the encounter: 30 min.       Electronically signed by DESMOND Cisneros CNP on 9/12/2022 at 1:42 PM      (Please note that portions of this note were completed with a voice-recognition program. Efforts were made to edit the dictation but occasionally words are mis-transcribed.)

## 2022-09-28 LAB
CREATININE, RANDOM URINE: 108.3 MG/DL (ref 20–370)
HBA1C MFR BLD: 8.5 % (ref 4.4–6.4)
MICROALBUMIN UR-MCNC: 1 MG/DL (ref 0–1.7)
MICROALBUMIN/CREAT UR-RTO: 9.23

## 2022-09-29 DIAGNOSIS — E10.9 TYPE 1 DIABETES MELLITUS WITHOUT COMPLICATION (HCC): Primary | ICD-10-CM

## 2022-10-04 ENCOUNTER — OFFICE VISIT (OUTPATIENT)
Dept: OBGYN | Age: 22
End: 2022-10-04
Payer: COMMERCIAL

## 2022-10-04 VITALS
HEART RATE: 67 BPM | WEIGHT: 155.6 LBS | HEIGHT: 69 IN | OXYGEN SATURATION: 96 % | BODY MASS INDEX: 23.05 KG/M2 | SYSTOLIC BLOOD PRESSURE: 110 MMHG | DIASTOLIC BLOOD PRESSURE: 64 MMHG

## 2022-10-04 DIAGNOSIS — Z30.46 ENCOUNTER FOR NEXPLANON REMOVAL: Primary | ICD-10-CM

## 2022-10-04 DIAGNOSIS — Z30.09 FAMILY PLANNING EDUCATION, GUIDANCE, AND COUNSELING: ICD-10-CM

## 2022-10-04 PROCEDURE — 11982 REMOVE DRUG IMPLANT DEVICE: CPT | Performed by: ADVANCED PRACTICE MIDWIFE

## 2022-10-04 ASSESSMENT — ENCOUNTER SYMPTOMS
RESPIRATORY NEGATIVE: 1
EYES NEGATIVE: 1
ALLERGIC/IMMUNOLOGIC NEGATIVE: 1
GASTROINTESTINAL NEGATIVE: 1

## 2022-10-04 NOTE — PROGRESS NOTES
Subjective:      Patient ID: Aime Chavez  is a 20 y.o. y.o. female. John De La Torre presents today for nexplanon removal. She reports this is her second nexplanon, it has been in for three years and she desires it to be removed. She reports that she is having irregular menses, most often monthly and somewhat unpredictable. She reports that she is not currently sexually active. Discussion regarding nexplanon removal, R&B, she consents both verbal and written. Review of Systems   Constitutional: Negative. HENT: Negative. Eyes: Negative. Respiratory: Negative. Cardiovascular: Negative. Gastrointestinal: Negative. Endocrine: Negative. Genitourinary:  Positive for menstrual problem. Musculoskeletal: Negative. Skin: Negative. Allergic/Immunologic: Negative. Neurological: Negative. Hematological: Negative. Psychiatric/Behavioral: Negative. Breast ROS: negative    Objective:   /64 (Site: Right Upper Arm, Position: Sitting, Cuff Size: Medium Adult)   Pulse 67   Ht 5' 9\" (1.753 m)   Wt 155 lb 9.6 oz (70.6 kg)   LMP 09/30/2022 (Approximate)   SpO2 96%   Breastfeeding No   BMI 22.98 kg/m²   Physical Exam  Constitutional:       Appearance: She is well-developed and normal weight. HENT:      Head: Normocephalic and atraumatic. Eyes:      Conjunctiva/sclera: Conjunctivae normal.   Cardiovascular:      Rate and Rhythm: Normal rate. Pulmonary:      Effort: Pulmonary effort is normal.      Breath sounds: Normal breath sounds. Abdominal:      Palpations: Abdomen is soft. Genitourinary:     Vagina: Normal.      Comments: Pelvic deferred. Musculoskeletal:         General: Normal range of motion. Cervical back: Normal range of motion and neck supple. Comments: Nexplanon located left upper inner arm. Area cleansed with betadine x 3 swabs. Lidocaine 2% w 1:100,000 epinephrine injected into the distal end of the Nexplanon.  A 1/4\" horizontal incision made at the base, adhesions surrounding device removed and Nexplanon removed intact. Intact Nexplanon confirmed by patient  and Tee Tatum LPN. EBL< 0.5 ml. Wound reapproximated with 1/2\" steri-strips x 3 and covered with sterile dressing Procedure tolerated well. Skin:     General: Skin is warm and dry. Neurological:      Mental Status: She is alert and oriented to person, place, and time. Deep Tendon Reflexes: Reflexes are normal and symmetric. Psychiatric:         Mood and Affect: Mood normal.         Thought Content: Thought content normal.         Assessment:      Diagnosis Orders   1. Encounter for Nexplanon removal        2. Family planning education, guidance, and counseling                Plan:   Education: she currently declines contraception. She desires to see how her menses are without hormones. Recommend condom use to protect against STI and unintended pregnancy with each act of intercourse. Instructed to keep area clean and x 48 hours. RTO 3 weeks for annual examination.

## 2022-10-24 ENCOUNTER — PATIENT MESSAGE (OUTPATIENT)
Dept: DIABETES SERVICES | Age: 22
End: 2022-10-24

## 2022-10-24 DIAGNOSIS — E10.9 TYPE 1 DIABETES MELLITUS WITHOUT COMPLICATION (HCC): ICD-10-CM

## 2022-10-24 RX ORDER — INSULIN PMP CART,AUT,G6/7,CNTR
1 EACH SUBCUTANEOUS
Qty: 30 EACH | Refills: 1 | Status: SHIPPED | OUTPATIENT
Start: 2022-10-24 | End: 2022-11-02 | Stop reason: SDUPTHER

## 2022-10-24 NOTE — TELEPHONE ENCOUNTER
From: Paige Sauer  To: Blanca Mccallum  Sent: 10/24/2022 1:10 PM EDT  Subject: Change of insurance    I just changed my insurance and called to reorder Omnipods theres only an option for  at pharmacy but I believe they get shipped to my home. I also needed to make sure that my insulin is sent to [facility] in [place] on [address] instead of the [facility] in [place], as my new insurance no longer is covered at [facility].

## 2022-10-31 RX ORDER — INSULIN ASPART 100 [IU]/ML
INJECTION, SOLUTION INTRAVENOUS; SUBCUTANEOUS
Qty: 20 ML | Refills: 5 | Status: SHIPPED | OUTPATIENT
Start: 2022-10-31

## 2022-11-02 RX ORDER — INSULIN PMP CART,AUT,G6/7,CNTR
1 EACH SUBCUTANEOUS
Qty: 30 EACH | Refills: 1 | Status: SHIPPED | OUTPATIENT
Start: 2022-11-02 | End: 2022-11-18 | Stop reason: SDUPTHER

## 2022-11-18 DIAGNOSIS — E10.9 TYPE 1 DIABETES MELLITUS WITHOUT COMPLICATION (HCC): ICD-10-CM

## 2022-11-18 RX ORDER — INSULIN PMP CART,AUT,G6/7,CNTR
1 EACH SUBCUTANEOUS
Qty: 30 EACH | Refills: 1 | Status: SHIPPED | OUTPATIENT
Start: 2022-11-18

## 2022-11-18 NOTE — TELEPHONE ENCOUNTER
Veronica Maradiaga called requesting a refill of the below medication which has been pended for you:     Requested Prescriptions     Pending Prescriptions Disp Refills    Insulin Disposable Pump (OMNIPOD 5 G6 POD, GEN 5,) MISC 30 each 1     Si Units by Does not apply route every 3 days       Last Appointment Date: 2022  Next Appointment Date: 2022    No Known Allergies

## 2022-12-07 DIAGNOSIS — E10.9 TYPE 1 DIABETES MELLITUS WITHOUT COMPLICATION (HCC): ICD-10-CM

## 2022-12-07 RX ORDER — INSULIN PMP CART,AUT,G6/7,CNTR
1 EACH SUBCUTANEOUS
Qty: 30 EACH | Refills: 1 | Status: SHIPPED | OUTPATIENT
Start: 2022-12-07

## 2022-12-09 RX ORDER — INSULIN ASPART 100 [IU]/ML
INJECTION, SOLUTION INTRAVENOUS; SUBCUTANEOUS
Qty: 20 ML | Refills: 5 | Status: SHIPPED | OUTPATIENT
Start: 2022-12-09

## 2022-12-09 NOTE — TELEPHONE ENCOUNTER
Aba Milton called requesting a refill of the below medication which has been pended for you:     Requested Prescriptions     Pending Prescriptions Disp Refills    insulin aspart (NOVOLOG) 100 UNIT/ML injection vial 20 mL 5     Sig: PER OMNIPOD. MAX DAILY DOSE OF 50 UNITS.        Last Appointment Date: 9/12/2022  Next Appointment Date: 12/19/2022    No Known Allergies

## 2022-12-19 ENCOUNTER — OFFICE VISIT (OUTPATIENT)
Dept: DIABETES SERVICES | Age: 22
End: 2022-12-19
Payer: COMMERCIAL

## 2022-12-19 VITALS
BODY MASS INDEX: 22.43 KG/M2 | RESPIRATION RATE: 16 BRPM | SYSTOLIC BLOOD PRESSURE: 92 MMHG | WEIGHT: 148 LBS | HEIGHT: 68 IN | HEART RATE: 76 BPM | DIASTOLIC BLOOD PRESSURE: 64 MMHG

## 2022-12-19 DIAGNOSIS — E10.9 TYPE 1 DIABETES MELLITUS WITHOUT COMPLICATION (HCC): ICD-10-CM

## 2022-12-19 PROCEDURE — 95251 CONT GLUC MNTR ANALYSIS I&R: CPT | Performed by: NURSE PRACTITIONER

## 2022-12-19 PROCEDURE — G8420 CALC BMI NORM PARAMETERS: HCPCS | Performed by: NURSE PRACTITIONER

## 2022-12-19 PROCEDURE — 2022F DILAT RTA XM EVC RTNOPTHY: CPT | Performed by: NURSE PRACTITIONER

## 2022-12-19 PROCEDURE — 3052F HG A1C>EQUAL 8.0%<EQUAL 9.0%: CPT | Performed by: NURSE PRACTITIONER

## 2022-12-19 PROCEDURE — G8484 FLU IMMUNIZE NO ADMIN: HCPCS | Performed by: NURSE PRACTITIONER

## 2022-12-19 PROCEDURE — G8427 DOCREV CUR MEDS BY ELIG CLIN: HCPCS | Performed by: NURSE PRACTITIONER

## 2022-12-19 PROCEDURE — 1036F TOBACCO NON-USER: CPT | Performed by: NURSE PRACTITIONER

## 2022-12-19 PROCEDURE — 99214 OFFICE O/P EST MOD 30 MIN: CPT | Performed by: NURSE PRACTITIONER

## 2022-12-19 RX ORDER — INSULIN PMP CART,AUT,G6/7,CNTR
EACH SUBCUTANEOUS
Qty: 30 EACH | Refills: 1 | Status: SHIPPED | OUTPATIENT
Start: 2022-12-19

## 2022-12-19 ASSESSMENT — ENCOUNTER SYMPTOMS
SHORTNESS OF BREATH: 0
ABDOMINAL PAIN: 0
RESPIRATORY NEGATIVE: 1
DIARRHEA: 0

## 2022-12-19 NOTE — PROGRESS NOTES
[address] [facility] INTERNAL MED A DEPARTMENT OF [place] [address]  [address] [facility], [address]  [facility] [address] [phone]        HISTORY:    Brando Epperson presents today for evaluation and management of:  Chief Complaint   Patient presents with    Diabetes    3 Month Follow-Up       No care team member to display      Interval History:    Past DM Medications   MDIx1 year      Current Diabetic Medications  novolog for use with omnipod max daily dose 50 units. Office visit today is completed by Diabetes specialist NP, she has been using insulin pump (omnipod) for greater than 6 months with frequent changes in glucose and insulin levels. She is able to use omnipod safely and appropiratly. DKA episodes: 0    09/12/22   Marcellus BARROW Chika Moses is a 20 y.o. female patient who presents to clinic today for her diabetes. she has a history of missed doses which contributes to her diabetes. At previous visit she was upgraded to omnipod 5 closed loop and doing much better. she denies any current signs or symptoms of hyper/hypoglycemia. she states they are taking their medications as prescribed without any adverse effects. Diet: Counting carbohydrates  Exercise: walking  BS testing: uses cgm daily with success and is adherent to cgm therapy  Issues: deneis  Diabetic foot exam up-to-date: Yes  Diabetic retinal exam up-to-date: Yes  Hypoglycemia as needed treatment: Glucose tabs and emergency glucagon    12/19/22   Brando Epperson is a 20 y.o. female patient who presents to clinic today for her diabetes. she has a history of non compliance which contributes to her diabetes. At previous visit basal rate increased. she denies any current signs or symptoms of hyper/hypoglycemia. she states they are taking their medications as prescribed without any adverse effects. She does admit to taking bolus after meals, guessing at carbs.  She is having trouble getting a full month supply of pods. Does not run out of insulin. Diet: counting carbs  Exercise: walking  BS testing: uses cgm daily with success and is adherent to cgm therapy  Hypoglycemia: Yes    Sweats and Tremors     Hypoglycemia Frequency: 1 per month  Hypoglycemia as needed treatment: snack, glucose tabs or emergency glucagon. Issues: denies   Diabetic foot exam up-to-date: Yes  Diabetic retinal exam up-to-date: Yes    Diabetes complications:none            Past Medical History:   Diagnosis Date    Diabetes mellitus ([facility]     Type 1 diabetes mellitus ([facility]      Family History   Problem Relation Age of Onset    High Blood Pressure Paternal Grandmother     High Blood Pressure Paternal Grandfather      Social History     Tobacco Use    Smoking status: Never    Smokeless tobacco: Never   Vaping Use    Vaping Use: Never used   Substance Use Topics    Alcohol use: Never    Drug use: Never     No Known Allergies    MEDICATIONS:  Current Outpatient Medications   Medication Sig Dispense Refill    Insulin Disposable Pump (OMNIPOD 5 G6 POD, GEN 5,) MISC Use daily to administer insulin. Change pod every 3 days. 30 each 1    insulin aspart (NOVOLOG) 100 UNIT/ML injection vial PER OMNIPOD. MAX DAILY DOSE OF 50 UNITS. 20 mL 5    Insulin Disposable Pump (OMNIPOD 5 G6 INTRO, GEN 5,) KIT 1 kit by Does not apply route every 3 days (Patient not taking: Reported on 10/4/2022) 1 kit 0    glucagon 1 MG injection Glucagon Emergency Kit 1 mg solution for injection       No current facility-administered medications for this visit. Review ofSymptoms:  Review of Systems   Constitutional:  Positive for fatigue. Negative for unexpected weight change. Eyes:  Negative for visual disturbance. Respiratory: Negative. Negative for shortness of breath. Cardiovascular:  Negative for chest pain and leg swelling. Gastrointestinal:  Negative for abdominal pain and diarrhea. Endocrine: Negative for polydipsia, polyphagia and polyuria. Genitourinary: Negative. Musculoskeletal: Negative. Skin:  Negative for rash and wound. Neurological:  Negative for dizziness, tremors, seizures and headaches. Psychiatric/Behavioral: Negative. Negative for confusion and decreased concentration. Theremainder of a complete 14-point review of systems is negative. Vital Signs: BP 92/64 (Site: Right Upper Arm, Position: Sitting, Cuff Size: Medium Adult)   Pulse 76   Resp 16   Ht 5' 8\" (1.727 m)   Wt 148 lb (67.1 kg)   LMP 12/12/2022   BMI 22.50 kg/m²      Wt Readings from Last 3 Encounters:   12/19/22 148 lb (67.1 kg)   10/04/22 155 lb 9.6 oz (70.6 kg)   09/12/22 156 lb (70.8 kg)     Body mass index is 22.5 kg/m².   LABS:  Hemoglobin A1C   Date Value Ref Range Status   09/28/2022 8.5 (H) 4.4 - 6.4 % Final   03/02/2022 8.7 (H) 4.0 - 6.0 % Final     Lab Results   Component Value Date    LABMICR 1.0 09/28/2022     Lab Results   Component Value Date     03/02/2022    K 3.7 03/02/2022     03/02/2022    CO2 28 03/02/2022    BUN 11 03/02/2022    CREATININE 0.63 03/02/2022    GLUCOSE 70 03/02/2022    CALCIUM 9.2 03/02/2022    PROT 7.2 09/08/2021    LABALBU 4.5 09/08/2021    BILITOT 0.40 09/08/2021    ALKPHOS 19 (L) 09/08/2021    AST 20 09/08/2021    ALT 15 09/08/2021    LABGLOM >60 03/02/2022    GFRAA >60 03/02/2022     Lab Results   Component Value Date    CHOL 199 09/08/2021    CHOL 198 12/30/2019     Lab Results   Component Value Date    TRIG 50 09/08/2021    TRIG 50 12/30/2019     Lab Results   Component Value Date    HDL 79 03/02/2022    HDL 76 09/08/2021    HDL 64 12/30/2019     Lab Results   Component Value Date    LDLCHOLESTEROL 90 03/02/2022    LDLCHOLESTEROL 113 09/08/2021    LDLCALC 120 12/30/2019     Lab Results   Component Value Date    VLDL NOT REPORTED 09/08/2021     Lab Results   Component Value Date    CHOLHDLRATIO 2.2 03/02/2022    CHOLHDLRATIO 2.6 09/08/2021    CHOLHDLRATIO 3.1 12/30/2019           Physical Exam  Constitutional:       Appearance: She is well-developed. Eyes:      Pupils: Pupils are equal, round, and reactive to light. Neck:      Thyroid: No thyroid mass, thyromegaly or thyroid tenderness. Cardiovascular:      Rate and Rhythm: Normal rate and regular rhythm. Heart sounds: Normal heart sounds. Pulmonary:      Effort: Pulmonary effort is normal.      Breath sounds: Normal breath sounds. Abdominal:      General: Bowel sounds are normal.      Palpations: Abdomen is soft. Skin:     General: Skin is warm and dry. Comments: Negative for open/nonhealing wounds. Negative for lipohypertrophy. Neurological:      Mental Status: She is alert and oriented to person, place, and time. ASSESSMENT/PLAN:     Diagnosis Orders   1. Type 1 diabetes mellitus without complication (HCC)  Insulin Disposable Pump (OMNIPOD 5 G6 POD, GEN 5,) MISC        No orders of the defined types were placed in this encounter. Orders Placed This Encounter   Medications    Insulin Disposable Pump (OMNIPOD 5 G6 POD, GEN 5,) MISC     Sig: Use daily to administer insulin. Change pod every 3 days. Dispense:  30 each     Refill:  1     Requested Prescriptions     Signed Prescriptions Disp Refills    Insulin Disposable Pump (OMNIPOD 5 G6 POD, GEN 5,) MISC 30 each 1     Sig: Use daily to administer insulin. Change pod every 3 days. 1. Type 1 diabetes mellitus without complication (HCC)  - Unstable  HbA1C goal is less than 7%. - Fasting blood glucose goal is 70-120mg/dl and postprandial blood sugar goal is less than 180 mg/dl. - Labs reviewed including most recent A1c, UACR and kidney function. Repeat labs due in 1 week. -We discussed in great detail dietary modifications they can make to better improve their blood sugars. -follow up diabetes education completed, all questions answered. CGM report downloaded and reviewed from the past 2 weeks scanned to media tab.  Time in range 61%, 37% hyperglycemia, and hypoglycemia 2%. and average glucose 169 mg/dl. Insulin pump settings downloaded and reviewed. Scanned into media tab. -a1c due now, work on correct carb count, bolus before meals and double check when in automode. Discussed signs and symptoms of hyper/hypoglycemia and how to treat. Encouraged 150 minutes of physical activity per week. Follow a low carbohydrate diet. Encouraged at least 7 hours of sleep. The patient was informed of the goals of diabetes management. This can only be accomplished by watching their diet and exercise levels. We certainly use medicines to help attain these goals. The consequences of not controlling blood sugars were discussed. These include blindness, heart disease, stroke, kidney disease, and possibly need for dialysis. They were told to be careful with their foot care as diabetics often have nerve damage, infections and risk for limb amutations . They also need a dilated eye exam yearly. We discussed the issues of diet, exercise, medication, complication avoidance, reviewed the signs and symptoms of diabetes, hypoglycemic episodes, significance of HbA1C.     - Insulin Disposable Pump (OMNIPOD 5 G6 POD, GEN 5,) MISC; Use daily to administer insulin. Change pod every 3 days. Dispense: 30 each; Refill: 1            Answered all patient questions. Agrees to follow plan of care and to follow up in 3 months, sooner if needed. Call office if unexplained blood sugars less than 70 occur or above 400. Call office or access [email or web address] with any further questions or concerns. Be sure to bring glucometer/food log at next appointment. Total time spent reviewing chart, labs, counseling patient and documenting on the date of the encounter: 30 min.       Electronically signed by DESMOND Ayala CNP on 12/19/2022 at 1:12 PM      (Please note that portions of this note were completed with a voice-recognition program. Efforts were made to edit the dictation but occasionally words are mis-transcribed.)

## 2023-01-04 DIAGNOSIS — E10.9 TYPE 1 DIABETES MELLITUS WITHOUT COMPLICATION (HCC): ICD-10-CM

## 2023-01-04 RX ORDER — INSULIN PMP CART,AUT,G6/7,CNTR
EACH SUBCUTANEOUS
Qty: 30 EACH | Refills: 1 | Status: SHIPPED | OUTPATIENT
Start: 2023-01-04

## 2023-01-04 RX ORDER — INSULIN ASPART 100 [IU]/ML
INJECTION, SOLUTION INTRAVENOUS; SUBCUTANEOUS
Qty: 20 ML | Refills: 5 | Status: SHIPPED | OUTPATIENT
Start: 2023-01-04

## 2023-01-05 ENCOUNTER — PATIENT MESSAGE (OUTPATIENT)
Dept: DIABETES SERVICES | Age: 23
End: 2023-01-05

## 2023-01-05 DIAGNOSIS — E10.9 TYPE 1 DIABETES MELLITUS WITHOUT COMPLICATION (HCC): Primary | ICD-10-CM

## 2023-01-05 RX ORDER — BLOOD-GLUCOSE TRANSMITTER
EACH MISCELLANEOUS
Qty: 1 EACH | Refills: 3 | Status: SHIPPED | OUTPATIENT
Start: 2023-01-05

## 2023-01-05 RX ORDER — BLOOD-GLUCOSE SENSOR
EACH MISCELLANEOUS
Qty: 3 EACH | Refills: 3 | Status: SHIPPED | OUTPATIENT
Start: 2023-01-05

## 2023-01-05 NOTE — TELEPHONE ENCOUNTER
From: Bhakti Casey  To: Jonnie Benjamin  Sent: 1/5/2023 11:39 AM EST  Subject: Dexcom transmitter and applicators    I have recently been notified that the [address] [facility] [facility] med that ships my sensors will no longer ship to me. Is there a way to request the Dexcom sensors to a different pharmacy? Im not sure how to reorder and I need a new transmitter asap.

## 2023-02-02 RX ORDER — INSULIN ASPART 100 [IU]/ML
INJECTION, SOLUTION INTRAVENOUS; SUBCUTANEOUS
Qty: 20 ML | Refills: 5 | Status: CANCELLED | OUTPATIENT
Start: 2023-02-02

## 2023-03-16 ENCOUNTER — HOSPITAL ENCOUNTER (OUTPATIENT)
Age: 23
Discharge: HOME | End: 2023-03-16
Payer: COMMERCIAL

## 2023-03-16 DIAGNOSIS — E10.9 TYPE 1 DIABETES MELLITUS WITHOUT COMPLICATION (HCC): ICD-10-CM

## 2023-03-16 PROCEDURE — 36415 COLL VENOUS BLD VENIPUNCTURE: CPT

## 2023-03-16 PROCEDURE — 83036 HEMOGLOBIN GLYCOSYLATED A1C: CPT

## 2023-03-17 LAB
EST. AVERAGE GLUCOSE BLD GHB EST-MCNC: 154 MG/DL
HBA1C MFR BLD: 7 % (ref 4–6)

## 2023-03-22 ENCOUNTER — OFFICE VISIT (OUTPATIENT)
Dept: DIABETES SERVICES | Age: 23
End: 2023-03-22
Payer: COMMERCIAL

## 2023-03-22 VITALS
SYSTOLIC BLOOD PRESSURE: 98 MMHG | WEIGHT: 148 LBS | RESPIRATION RATE: 16 BRPM | HEIGHT: 68 IN | HEART RATE: 60 BPM | BODY MASS INDEX: 22.43 KG/M2 | DIASTOLIC BLOOD PRESSURE: 82 MMHG

## 2023-03-22 DIAGNOSIS — E10.9 TYPE 1 DIABETES MELLITUS WITHOUT COMPLICATION (HCC): Primary | ICD-10-CM

## 2023-03-22 PROCEDURE — G8420 CALC BMI NORM PARAMETERS: HCPCS | Performed by: NURSE PRACTITIONER

## 2023-03-22 PROCEDURE — 3051F HG A1C>EQUAL 7.0%<8.0%: CPT | Performed by: NURSE PRACTITIONER

## 2023-03-22 PROCEDURE — 95251 CONT GLUC MNTR ANALYSIS I&R: CPT | Performed by: NURSE PRACTITIONER

## 2023-03-22 PROCEDURE — 99214 OFFICE O/P EST MOD 30 MIN: CPT | Performed by: NURSE PRACTITIONER

## 2023-03-22 PROCEDURE — G8427 DOCREV CUR MEDS BY ELIG CLIN: HCPCS | Performed by: NURSE PRACTITIONER

## 2023-03-22 PROCEDURE — 2022F DILAT RTA XM EVC RTNOPTHY: CPT | Performed by: NURSE PRACTITIONER

## 2023-03-22 PROCEDURE — 1036F TOBACCO NON-USER: CPT | Performed by: NURSE PRACTITIONER

## 2023-03-22 PROCEDURE — G8484 FLU IMMUNIZE NO ADMIN: HCPCS | Performed by: NURSE PRACTITIONER

## 2023-03-22 ASSESSMENT — ENCOUNTER SYMPTOMS
RESPIRATORY NEGATIVE: 1
DIARRHEA: 0
ABDOMINAL PAIN: 0
SHORTNESS OF BREATH: 0

## 2023-03-22 NOTE — PROGRESS NOTES
[address] [facility] INTERNAL MED A DEPARTMENT OF [place] [address]  [address] [facility], [address]  [facility] [address] [phone]        HISTORY:    Ricci Mcghee presents today for evaluation and management of:  Chief Complaint   Patient presents with    Diabetes    3 Month Follow-Up       No care team member to display      Interval History:    Past DM Medications   MDIx1 year      Current Diabetic Medications  novolog for use with omnipod max daily dose 40 units. Office visit today is completed by Diabetes specialist NP, she has been using insulin pump (omnipod) for greater than 6 months with frequent changes in glucose and insulin levels. She is able to use omnipod safely and appropiratly. DKA episodes: 0    12/19/22   Ricci Mcghee is a 20 y.o. female patient who presents to clinic today for her diabetes. she has a history of non compliance which contributes to her diabetes. At previous visit basal rate increased. she denies any current signs or symptoms of hyper/hypoglycemia. she states they are taking their medications as prescribed without any adverse effects. She does admit to taking bolus after meals, guessing at carbs. She is having trouble getting a full month supply of pods. Does not run out of insulin. Diet: counting carbs  Exercise: walking  BS testing: uses cgm daily with success and is adherent to cgm therapy  Hypoglycemia: Yes    Sweats and Tremors                 Hypoglycemia Frequency: 1 per month  Hypoglycemia as needed treatment: snack, glucose tabs or emergency glucagon. Issues: denies   Diabetic foot exam up-to-date: Yes  Diabetic retinal exam up-to-date: Yes     Diabetes complications:none    60/52/00   Ricci Mcghee is a 24 y.o. female patient who presents to clinic today for her diabetes. she has a history of non compliance which contributes to her diabetes. At previous visit diabetes counseling was provided.  she denies

## 2023-05-11 LAB — DIABETIC RETINOPATHY: NEGATIVE

## 2023-05-16 DIAGNOSIS — E10.9 TYPE 1 DIABETES MELLITUS WITHOUT COMPLICATION (HCC): ICD-10-CM

## 2023-05-17 RX ORDER — PROCHLORPERAZINE 25 MG/1
SUPPOSITORY RECTAL
Qty: 3 EACH | Refills: 5 | Status: SHIPPED | OUTPATIENT
Start: 2023-05-17

## 2023-06-28 ENCOUNTER — OFFICE VISIT (OUTPATIENT)
Dept: DIABETES SERVICES | Age: 23
End: 2023-06-28

## 2023-06-28 VITALS
WEIGHT: 140.6 LBS | HEART RATE: 62 BPM | SYSTOLIC BLOOD PRESSURE: 126 MMHG | RESPIRATION RATE: 15 BRPM | HEIGHT: 68 IN | BODY MASS INDEX: 21.31 KG/M2 | DIASTOLIC BLOOD PRESSURE: 82 MMHG

## 2023-06-28 DIAGNOSIS — E10.9 TYPE 1 DIABETES MELLITUS WITHOUT COMPLICATION (HCC): Primary | ICD-10-CM

## 2023-06-28 ASSESSMENT — ENCOUNTER SYMPTOMS
ABDOMINAL PAIN: 0
DIARRHEA: 0
SHORTNESS OF BREATH: 0
RESPIRATORY NEGATIVE: 1

## 2023-07-05 ENCOUNTER — HOSPITAL ENCOUNTER (OUTPATIENT)
Age: 23
Discharge: HOME | End: 2023-07-05
Payer: COMMERCIAL

## 2023-07-05 DIAGNOSIS — E10.9 TYPE 1 DIABETES MELLITUS WITHOUT COMPLICATION (HCC): ICD-10-CM

## 2023-07-05 LAB
ALBUMIN SERPL-MCNC: 4.7 G/DL (ref 3.5–5.2)
ALBUMIN/GLOB SERPL: 1.4 {RATIO} (ref 1–2.5)
ALP SERPL-CCNC: 18 U/L (ref 35–104)
ALT SERPL-CCNC: 18 U/L (ref 5–33)
ANION GAP SERPL CALCULATED.3IONS-SCNC: 12 MMOL/L (ref 9–17)
AST SERPL-CCNC: 21 U/L
BILIRUB SERPL-MCNC: 0.5 MG/DL (ref 0.3–1.2)
BUN SERPL-MCNC: 12 MG/DL (ref 6–20)
BUN/CREAT SERPL: 16 (ref 9–20)
CALCIUM SERPL-MCNC: 9.8 MG/DL (ref 8.6–10.4)
CHLORIDE SERPL-SCNC: 99 MMOL/L (ref 98–107)
CHOLEST SERPL-MCNC: 207 MG/DL
CHOLESTEROL/HDL RATIO: 2.7
CO2 SERPL-SCNC: 25 MMOL/L (ref 20–31)
CREAT SERPL-MCNC: 0.75 MG/DL (ref 0.5–0.9)
GFR SERPL CREATININE-BSD FRML MDRD: >60 ML/MIN/1.73M2
GLUCOSE SERPL-MCNC: 186 MG/DL (ref 70–99)
HDLC SERPL-MCNC: 78 MG/DL
LDLC SERPL CALC-MCNC: 119 MG/DL (ref 0–130)
POTASSIUM SERPL-SCNC: 4.4 MMOL/L (ref 3.7–5.3)
PROT SERPL-MCNC: 8 G/DL (ref 6.4–8.3)
SODIUM SERPL-SCNC: 136 MMOL/L (ref 135–144)
TRIGL SERPL-MCNC: 49 MG/DL

## 2023-07-05 PROCEDURE — 36415 COLL VENOUS BLD VENIPUNCTURE: CPT

## 2023-07-05 PROCEDURE — 80053 COMPREHEN METABOLIC PANEL: CPT

## 2023-07-05 PROCEDURE — 83036 HEMOGLOBIN GLYCOSYLATED A1C: CPT

## 2023-07-05 PROCEDURE — 80061 LIPID PANEL: CPT

## 2023-07-06 LAB
EST. AVERAGE GLUCOSE BLD GHB EST-MCNC: 166 MG/DL
HBA1C MFR BLD: 7.4 % (ref 4–6)

## 2023-07-22 DIAGNOSIS — E10.9 TYPE 1 DIABETES MELLITUS WITHOUT COMPLICATION (HCC): ICD-10-CM

## 2023-07-24 RX ORDER — INSULIN PMP CART,AUT,G6/7,CNTR
EACH SUBCUTANEOUS
Qty: 30 EACH | Refills: 3 | Status: SHIPPED | OUTPATIENT
Start: 2023-07-24

## 2023-08-21 ENCOUNTER — TELEPHONE (OUTPATIENT)
Dept: INTERNAL MEDICINE | Age: 23
End: 2023-08-21

## 2023-09-27 ENCOUNTER — OFFICE VISIT (OUTPATIENT)
Dept: DIABETES SERVICES | Age: 23
End: 2023-09-27
Payer: COMMERCIAL

## 2023-09-27 VITALS
HEIGHT: 68 IN | OXYGEN SATURATION: 97 % | HEART RATE: 70 BPM | WEIGHT: 145 LBS | BODY MASS INDEX: 21.98 KG/M2 | DIASTOLIC BLOOD PRESSURE: 60 MMHG | SYSTOLIC BLOOD PRESSURE: 110 MMHG

## 2023-09-27 DIAGNOSIS — E10.9 TYPE 1 DIABETES MELLITUS WITHOUT COMPLICATION (HCC): Primary | ICD-10-CM

## 2023-09-27 PROCEDURE — 2022F DILAT RTA XM EVC RTNOPTHY: CPT | Performed by: NURSE PRACTITIONER

## 2023-09-27 PROCEDURE — 95251 CONT GLUC MNTR ANALYSIS I&R: CPT | Performed by: NURSE PRACTITIONER

## 2023-09-27 PROCEDURE — G8420 CALC BMI NORM PARAMETERS: HCPCS | Performed by: NURSE PRACTITIONER

## 2023-09-27 PROCEDURE — 3051F HG A1C>EQUAL 7.0%<8.0%: CPT | Performed by: NURSE PRACTITIONER

## 2023-09-27 PROCEDURE — 99214 OFFICE O/P EST MOD 30 MIN: CPT | Performed by: NURSE PRACTITIONER

## 2023-09-27 PROCEDURE — 1036F TOBACCO NON-USER: CPT | Performed by: NURSE PRACTITIONER

## 2023-09-27 PROCEDURE — G8427 DOCREV CUR MEDS BY ELIG CLIN: HCPCS | Performed by: NURSE PRACTITIONER

## 2023-09-27 ASSESSMENT — ENCOUNTER SYMPTOMS
DIARRHEA: 0
ABDOMINAL PAIN: 0
RESPIRATORY NEGATIVE: 1
SHORTNESS OF BREATH: 0

## 2023-09-27 NOTE — PROGRESS NOTES
[address] [facility] INTERNAL MED A DEPARTMENT  [address]  [address] [facility] [address] [phone]        HISTORY:    Dakota Bethea presents today for evaluation and management of:  Chief Complaint   Patient presents with    Diabetes     3 month follow up       No care team member to display      Interval History:    Past DM Medications   MDIx1 year      Current Diabetic Medications  novolog for use with omnipod max daily dose 40 units. Office visit today is completed by Diabetes specialist NP, she has been using insulin pump (omnipod) for greater than 6 months with frequent changes in glucose and insulin levels. She is able to use omnipod safely and appropiratly. DKA episodes: 0       06/28/23   Marcellus Tapia is a 24 y.o. female patient who presents to clinic today for her diabetes. she has a history of non compliance  which contributes to her diabetes. At previous visit diabetes counseling was provided. she denies any current signs or symptoms of hyper/hypoglycemia. she states they are taking their medications as prescribed without any adverse effects. Diet: counting carbs  Exercise: none  BS testing: uses cgm daily with success and is adherent to cgm therapy  Hypoglycemia: No  Hypoglycemia as needed treatment: snack, juice or emergency glucagon. Issues:   Diabetic foot exam up-to-date: Yes  Diabetic retinal exam up-to-date: Yes     Diabetes complications:none    04/73/18   Dakota Bethea is a 24 y.o. female patient who presents to clinic today for her diabetes. she has a history of non compliance which contributes to her diabetes. At previous visit . she denies any current signs or symptoms of hyper/hypoglycemia. she states they are taking their medications as prescribed without any adverse effects.  She has been testing out manual mode and feels her back up settings are to high and she is having a lot of lows while in

## 2023-11-12 DIAGNOSIS — E10.9 TYPE 1 DIABETES MELLITUS WITHOUT COMPLICATION (HCC): ICD-10-CM

## 2023-11-13 RX ORDER — PROCHLORPERAZINE 25 MG/1
SUPPOSITORY RECTAL
Qty: 1 EACH | Refills: 3 | Status: SHIPPED | OUTPATIENT
Start: 2023-11-13

## 2023-11-16 DIAGNOSIS — E10.9 TYPE 1 DIABETES MELLITUS WITHOUT COMPLICATION (HCC): ICD-10-CM

## 2023-11-20 RX ORDER — PROCHLORPERAZINE 25 MG/1
SUPPOSITORY RECTAL
Qty: 3 EACH | Refills: 5 | Status: SHIPPED | OUTPATIENT
Start: 2023-11-20

## 2024-01-19 NOTE — TELEPHONE ENCOUNTER
Pharmacy requesting a refill of the below medication which has been pended for you:     Requested Prescriptions     Pending Prescriptions Disp Refills    insulin aspart (NOVOLOG) 100 UNIT/ML injection vial [Pharmacy Med Name: INSULIN ASPART 100 UNIT/ML VL] 20 mL 5     Sig: USE WITH OMNIPOD, MAX DAILY DOSE OF 50 UNITS       Last Appointment Date: 9/27/2023  Next Appointment Date: 1/29/2024    No Known Allergies

## 2024-01-22 RX ORDER — INSULIN ASPART 100 [IU]/ML
INJECTION, SOLUTION INTRAVENOUS; SUBCUTANEOUS
Qty: 20 ML | Refills: 5 | Status: SHIPPED | OUTPATIENT
Start: 2024-01-22

## 2024-01-23 ENCOUNTER — PATIENT MESSAGE (OUTPATIENT)
Dept: DIABETES SERVICES | Age: 24
End: 2024-01-23

## 2024-01-23 NOTE — TELEPHONE ENCOUNTER
From: Marcellus Moncada  To: Corine Saucedo  Sent: 1/23/2024 3:07 PM EST  Subject: Food sensitivity blood work options     Hello I’m reaching out because I know I’m due for lab work for your office. If possible I am working to see if I can get some food sensitivity lab work done. Are you able to request food sensitivity labs through you? Or the office? If you could send me in the right direction or answer some follow questions I have about this subject that would be greatly appreciated, thank you.

## 2024-01-24 ENCOUNTER — HOSPITAL ENCOUNTER (OUTPATIENT)
Age: 24
Discharge: HOME | End: 2024-01-24
Payer: COMMERCIAL

## 2024-01-24 DIAGNOSIS — E10.9 TYPE 1 DIABETES MELLITUS WITHOUT COMPLICATION (HCC): ICD-10-CM

## 2024-01-24 LAB
ANION GAP SERPL CALCULATED.3IONS-SCNC: 10 MMOL/L (ref 9–17)
BUN SERPL-MCNC: 11 MG/DL (ref 6–20)
BUN/CREAT SERPL: 16 (ref 9–20)
CALCIUM SERPL-MCNC: 9.2 MG/DL (ref 8.6–10.4)
CHLORIDE SERPL-SCNC: 103 MMOL/L (ref 98–107)
CO2 SERPL-SCNC: 26 MMOL/L (ref 20–31)
CREAT SERPL-MCNC: 0.7 MG/DL (ref 0.5–0.9)
GFR SERPL CREATININE-BSD FRML MDRD: >60 ML/MIN/1.73M2
GLUCOSE SERPL-MCNC: 135 MG/DL (ref 70–99)
POTASSIUM SERPL-SCNC: 4.7 MMOL/L (ref 3.7–5.3)
SODIUM SERPL-SCNC: 139 MMOL/L (ref 135–144)

## 2024-01-24 PROCEDURE — 82570 ASSAY OF URINE CREATININE: CPT

## 2024-01-24 PROCEDURE — 82043 UR ALBUMIN QUANTITATIVE: CPT

## 2024-01-24 PROCEDURE — 80048 BASIC METABOLIC PNL TOTAL CA: CPT

## 2024-01-24 PROCEDURE — 36415 COLL VENOUS BLD VENIPUNCTURE: CPT

## 2024-01-24 PROCEDURE — 83036 HEMOGLOBIN GLYCOSYLATED A1C: CPT

## 2024-01-25 LAB
CREAT UR-MCNC: 160 MG/DL (ref 28–217)
EST. AVERAGE GLUCOSE BLD GHB EST-MCNC: 146 MG/DL
HBA1C MFR BLD: 6.7 % (ref 4–6)
MICROALBUMIN UR-MCNC: <12 MG/L (ref 0–20)
MICROALBUMIN/CREAT UR-RTO: NORMAL MCG/MG CREAT (ref 0–25)

## 2024-01-29 ENCOUNTER — OFFICE VISIT (OUTPATIENT)
Dept: DIABETES SERVICES | Age: 24
End: 2024-01-29
Payer: COMMERCIAL

## 2024-01-29 VITALS
HEIGHT: 68 IN | SYSTOLIC BLOOD PRESSURE: 110 MMHG | OXYGEN SATURATION: 96 % | BODY MASS INDEX: 21.37 KG/M2 | WEIGHT: 141 LBS | HEART RATE: 54 BPM | DIASTOLIC BLOOD PRESSURE: 66 MMHG

## 2024-01-29 DIAGNOSIS — E10.9 TYPE 1 DIABETES MELLITUS WITHOUT COMPLICATION (HCC): Primary | ICD-10-CM

## 2024-01-29 PROCEDURE — G8484 FLU IMMUNIZE NO ADMIN: HCPCS | Performed by: NURSE PRACTITIONER

## 2024-01-29 PROCEDURE — 3044F HG A1C LEVEL LT 7.0%: CPT | Performed by: NURSE PRACTITIONER

## 2024-01-29 PROCEDURE — G8420 CALC BMI NORM PARAMETERS: HCPCS | Performed by: NURSE PRACTITIONER

## 2024-01-29 PROCEDURE — G8427 DOCREV CUR MEDS BY ELIG CLIN: HCPCS | Performed by: NURSE PRACTITIONER

## 2024-01-29 PROCEDURE — 95251 CONT GLUC MNTR ANALYSIS I&R: CPT | Performed by: NURSE PRACTITIONER

## 2024-01-29 PROCEDURE — 2022F DILAT RTA XM EVC RTNOPTHY: CPT | Performed by: NURSE PRACTITIONER

## 2024-01-29 PROCEDURE — 99214 OFFICE O/P EST MOD 30 MIN: CPT | Performed by: NURSE PRACTITIONER

## 2024-01-29 PROCEDURE — 1036F TOBACCO NON-USER: CPT | Performed by: NURSE PRACTITIONER

## 2024-01-29 ASSESSMENT — ENCOUNTER SYMPTOMS
SHORTNESS OF BREATH: 0
DIARRHEA: 0
ABDOMINAL PAIN: 0
RESPIRATORY NEGATIVE: 1

## 2024-01-29 NOTE — PROGRESS NOTES
[facility] INTERNAL MED A DEPARTMENT OF [facility]  [address] [facility] [address] [phone]        HISTORY:    Marcellus Moncada presents today for evaluation and management of:  Chief Complaint   Patient presents with    Diabetes     4 month follow up       No care team member to display      Interval History:    Past DM Medications   MDIx1 year      Current Diabetic Medications  novolog for use with omnipod max daily dose 40 units.      Office visit today is completed by Diabetes specialist NP, she has been using insulin pump (omnipod) for greater than 6 months with frequent changes in glucose and insulin levels. She is able to use omnipod safely and appropiratly.      DKA episodes: 0    09/27/23   Marcellus Moncada is a 23 y.o. female patient who presents to clinic today for her diabetes. she has a history of non compliance which contributes to her diabetes. At previous visit . she denies any current signs or symptoms of hyper/hypoglycemia. she states they are taking their medications as prescribed without any adverse effects. She has been testing out manual mode and feels her back up settings are to high and she is having a lot of lows while in manual mode.   Diet: counting carbs  Exercise: none  BS testing: uses cgm daily with success and is adherent to cgm therapy  Hypoglycemia: No  Hypoglycemia as needed treatment: snack, juice or emergency glucagon.  Issues:   Diabetic foot exam up-to-date: Yes  Diabetic retinal exam up-to-date: Yes     Diabetes complications:none    01/29/24   Marcellus Moncada is a 23 y.o. female patient who presents to clinic today for her diabetes. she has a history of non compliance which contributes to her diabetes. At previous visit diabetes counseling was provided. she denies any current signs or symptoms of hyper/hypoglycemia. she states they are taking their medications as prescribed without any adverse

## 2024-02-13 ENCOUNTER — TELEPHONE (OUTPATIENT)
Dept: INTERNAL MEDICINE | Age: 24
End: 2024-02-13

## 2024-02-13 NOTE — TELEPHONE ENCOUNTER
Patient is requesting her PDM settings, she reports she does not have them and is having a hard time getting access to them. Per preet she will send them to patient through [employer]. Patient is agreeable.       Last Appt:  Visit date not found  Next Appt:   Visit date not found  Med verified in Epic

## 2024-02-14 ENCOUNTER — HOSPITAL ENCOUNTER (OUTPATIENT)
Age: 24
Discharge: HOME | End: 2024-02-14
Payer: COMMERCIAL

## 2024-02-14 DIAGNOSIS — R19.7 DIARRHEA, UNSPECIFIED TYPE: ICD-10-CM

## 2024-02-14 DIAGNOSIS — E10.9 TYPE 1 DIABETES MELLITUS WITHOUT COMPLICATION (HCC): ICD-10-CM

## 2024-02-14 DIAGNOSIS — R79.89 LOW SERUM VITAMIN D: ICD-10-CM

## 2024-02-14 DIAGNOSIS — R63.4 WEIGHT LOSS: ICD-10-CM

## 2024-02-14 DIAGNOSIS — R53.83 FATIGUE, UNSPECIFIED TYPE: ICD-10-CM

## 2024-02-14 LAB
25(OH)D3 SERPL-MCNC: 39.7 NG/ML
ALBUMIN SERPL-MCNC: 4.9 G/DL (ref 3.5–5.2)
ALBUMIN/GLOB SERPL: 1.4 {RATIO} (ref 1–2.5)
ALP SERPL-CCNC: 21 U/L (ref 35–104)
ALT SERPL-CCNC: 20 U/L (ref 5–33)
ANION GAP SERPL CALCULATED.3IONS-SCNC: 10 MMOL/L (ref 9–17)
AST SERPL-CCNC: 22 U/L
BILIRUB SERPL-MCNC: 0.7 MG/DL (ref 0.3–1.2)
BUN SERPL-MCNC: 17 MG/DL (ref 6–20)
BUN/CREAT SERPL: 24 (ref 9–20)
CALCIUM SERPL-MCNC: 9.7 MG/DL (ref 8.6–10.4)
CHLORIDE SERPL-SCNC: 98 MMOL/L (ref 98–107)
CO2 SERPL-SCNC: 24 MMOL/L (ref 20–31)
CORTIS SERPL-MCNC: 20.5 UG/DL (ref 2.7–18.4)
CORTISOL COLLECTION INFO: 908
CREAT SERPL-MCNC: 0.7 MG/DL (ref 0.5–0.9)
ERYTHROCYTE [DISTWIDTH] IN BLOOD BY AUTOMATED COUNT: 11.7 % (ref 11.8–14.4)
FERRITIN SERPL-MCNC: 69 NG/ML (ref 13–150)
GFR SERPL CREATININE-BSD FRML MDRD: >60 ML/MIN/1.73M2
GLUCOSE SERPL-MCNC: 273 MG/DL (ref 70–99)
HCT VFR BLD AUTO: 41.6 % (ref 36.3–47.1)
HGB BLD-MCNC: 14.5 G/DL (ref 11.9–15.1)
INSULIN COMMENT: 908
INSULIN REFERENCE RANGE:: NORMAL
INSULIN: 2.6 MU/L
MCH RBC QN AUTO: 31.9 PG (ref 25.2–33.5)
MCHC RBC AUTO-ENTMCNC: 34.9 G/DL (ref 25.2–33.5)
MCV RBC AUTO: 91.4 FL (ref 82.6–102.9)
NRBC BLD-RTO: 0 PER 100 WBC
PLATELET # BLD AUTO: 163 K/UL (ref 138–453)
PMV BLD AUTO: 10.7 FL (ref 8.1–13.5)
POTASSIUM SERPL-SCNC: 4.4 MMOL/L (ref 3.7–5.3)
PROT SERPL-MCNC: 8.4 G/DL (ref 6.4–8.3)
RBC # BLD AUTO: 4.55 M/UL (ref 3.95–5.11)
SODIUM SERPL-SCNC: 132 MMOL/L (ref 135–144)
T3FREE SERPL-MCNC: 3.1 PG/ML (ref 2.02–4.43)
T4 FREE SERPL-MCNC: 1.3 NG/DL (ref 0.9–1.7)
TSH SERPL DL<=0.05 MIU/L-ACNC: 1.44 UIU/ML (ref 0.3–5)
VIT B12 SERPL-MCNC: 723 PG/ML (ref 232–1245)
WBC OTHER # BLD: 5.2 K/UL (ref 3.5–11.3)

## 2024-02-14 PROCEDURE — 82607 VITAMIN B-12: CPT

## 2024-02-14 PROCEDURE — 84443 ASSAY THYROID STIM HORMONE: CPT

## 2024-02-14 PROCEDURE — 83516 IMMUNOASSAY NONANTIBODY: CPT

## 2024-02-14 PROCEDURE — 82728 ASSAY OF FERRITIN: CPT

## 2024-02-14 PROCEDURE — 80053 COMPREHEN METABOLIC PANEL: CPT

## 2024-02-14 PROCEDURE — 82533 TOTAL CORTISOL: CPT

## 2024-02-14 PROCEDURE — 86376 MICROSOMAL ANTIBODY EACH: CPT

## 2024-02-14 PROCEDURE — 83735 ASSAY OF MAGNESIUM: CPT

## 2024-02-14 PROCEDURE — 36415 COLL VENOUS BLD VENIPUNCTURE: CPT

## 2024-02-14 PROCEDURE — 82306 VITAMIN D 25 HYDROXY: CPT

## 2024-02-14 PROCEDURE — 83525 ASSAY OF INSULIN: CPT

## 2024-02-14 PROCEDURE — 84481 FREE ASSAY (FT-3): CPT

## 2024-02-14 PROCEDURE — 85027 COMPLETE CBC AUTOMATED: CPT

## 2024-02-14 PROCEDURE — 82784 ASSAY IGA/IGD/IGG/IGM EACH: CPT

## 2024-02-14 PROCEDURE — 84439 ASSAY OF FREE THYROXINE: CPT

## 2024-02-14 PROCEDURE — 84432 ASSAY OF THYROGLOBULIN: CPT

## 2024-02-15 LAB
GLIADIN IGA SER IA-ACNC: 1.3 U/ML
GLIADIN IGG SER IA-ACNC: <0.4 U/ML
IGA SERPL-MCNC: 129 MG/DL (ref 70–400)
THYROPEROXIDASE AB SERPL IA-ACNC: 6.1 IU/ML (ref 0–25)
TTG IGA SER IA-ACNC: 0.3 U/ML

## 2024-02-17 LAB — MAGNESIUM RBC: 5.7 MG/DL (ref 3.6–7.5)

## 2024-02-18 LAB — THYROGLOB SERPL-MCNC: 19.6 NG/ML (ref 1.3–31.8)

## 2024-04-26 ENCOUNTER — HOSPITAL ENCOUNTER (OUTPATIENT)
Age: 24
Discharge: HOME | End: 2024-04-26
Payer: COMMERCIAL

## 2024-04-26 DIAGNOSIS — E10.9 TYPE 1 DIABETES MELLITUS WITHOUT COMPLICATION (HCC): ICD-10-CM

## 2024-04-26 DIAGNOSIS — R63.4 WEIGHT LOSS: ICD-10-CM

## 2024-04-26 LAB
ALBUMIN SERPL-MCNC: 4.3 G/DL (ref 3.5–5.2)
ALBUMIN/GLOB SERPL: 1.5 {RATIO} (ref 1–2.5)
ALP SERPL-CCNC: 19 U/L (ref 35–104)
ALT SERPL-CCNC: 37 U/L (ref 5–33)
ANION GAP SERPL CALCULATED.3IONS-SCNC: 11 MMOL/L (ref 9–17)
AST SERPL-CCNC: 31 U/L
BILIRUB SERPL-MCNC: 0.5 MG/DL (ref 0.3–1.2)
BUN SERPL-MCNC: 24 MG/DL (ref 6–20)
BUN/CREAT SERPL: 34 (ref 9–20)
CALCIUM SERPL-MCNC: 9.3 MG/DL (ref 8.6–10.4)
CHLORIDE SERPL-SCNC: 105 MMOL/L (ref 98–107)
CHOLEST SERPL-MCNC: 202 MG/DL (ref 0–199)
CHOLESTEROL/HDL RATIO: 2
CO2 SERPL-SCNC: 24 MMOL/L (ref 20–31)
CREAT SERPL-MCNC: 0.7 MG/DL (ref 0.5–0.9)
ERYTHROCYTE [DISTWIDTH] IN BLOOD BY AUTOMATED COUNT: 11.9 % (ref 11.8–14.4)
EST. AVERAGE GLUCOSE BLD GHB EST-MCNC: 143 MG/DL
FERRITIN SERPL-MCNC: 60 NG/ML (ref 13–150)
GFR SERPL CREATININE-BSD FRML MDRD: >90 ML/MIN/1.73M2
GLUCOSE SERPL-MCNC: 127 MG/DL (ref 70–99)
HBA1C MFR BLD: 6.6 % (ref 4–6)
HCT VFR BLD AUTO: 39 % (ref 36.3–47.1)
HDLC SERPL-MCNC: 82 MG/DL
HGB BLD-MCNC: 13.2 G/DL (ref 11.9–15.1)
LDLC SERPL CALC-MCNC: 109 MG/DL (ref 0–100)
MCH RBC QN AUTO: 31.7 PG (ref 25.2–33.5)
MCHC RBC AUTO-ENTMCNC: 33.8 G/DL (ref 25.2–33.5)
MCV RBC AUTO: 93.5 FL (ref 82.6–102.9)
NRBC BLD-RTO: 0 PER 100 WBC
PLATELET # BLD AUTO: ABNORMAL K/UL (ref 138–453)
PLATELET, FLUORESCENCE: 130 K/UL (ref 138–453)
PLATELETS.RETICULATED NFR BLD AUTO: 8.6 % (ref 1.1–10.3)
POTASSIUM SERPL-SCNC: 4.3 MMOL/L (ref 3.7–5.3)
PROT SERPL-MCNC: 7.2 G/DL (ref 6.4–8.3)
RBC # BLD AUTO: 4.17 M/UL (ref 3.95–5.11)
SODIUM SERPL-SCNC: 140 MMOL/L (ref 135–144)
T3FREE SERPL-MCNC: 2.5 PG/ML (ref 2–4.4)
T4 FREE SERPL-MCNC: 1.3 NG/DL (ref 0.92–1.68)
TRIGL SERPL-MCNC: 51 MG/DL
TSH SERPL DL<=0.05 MIU/L-ACNC: 1.7 UIU/ML (ref 0.3–5)
VLDLC SERPL CALC-MCNC: 10 MG/DL
WBC OTHER # BLD: 4 K/UL (ref 3.5–11.3)

## 2024-04-26 PROCEDURE — 84481 FREE ASSAY (FT-3): CPT

## 2024-04-26 PROCEDURE — 84443 ASSAY THYROID STIM HORMONE: CPT

## 2024-04-26 PROCEDURE — 82728 ASSAY OF FERRITIN: CPT

## 2024-04-26 PROCEDURE — 83036 HEMOGLOBIN GLYCOSYLATED A1C: CPT

## 2024-04-26 PROCEDURE — 36415 COLL VENOUS BLD VENIPUNCTURE: CPT

## 2024-04-26 PROCEDURE — 80053 COMPREHEN METABOLIC PANEL: CPT

## 2024-04-26 PROCEDURE — 85027 COMPLETE CBC AUTOMATED: CPT

## 2024-04-26 PROCEDURE — 80061 LIPID PANEL: CPT

## 2024-04-26 PROCEDURE — 84439 ASSAY OF FREE THYROXINE: CPT

## 2024-04-26 PROCEDURE — 84252 ASSAY OF VITAMIN B-2: CPT

## 2024-04-29 ENCOUNTER — OFFICE VISIT (OUTPATIENT)
Dept: DIABETES SERVICES | Age: 24
End: 2024-04-29
Payer: COMMERCIAL

## 2024-04-29 VITALS
OXYGEN SATURATION: 98 % | BODY MASS INDEX: 21.07 KG/M2 | DIASTOLIC BLOOD PRESSURE: 68 MMHG | HEART RATE: 78 BPM | WEIGHT: 139 LBS | SYSTOLIC BLOOD PRESSURE: 104 MMHG | HEIGHT: 68 IN

## 2024-04-29 DIAGNOSIS — E10.9 TYPE 1 DIABETES MELLITUS WITHOUT COMPLICATION (HCC): Primary | ICD-10-CM

## 2024-04-29 LAB — VITAMIN B2: 104 NMOL/L (ref 5–50)

## 2024-04-29 PROCEDURE — 1036F TOBACCO NON-USER: CPT | Performed by: NURSE PRACTITIONER

## 2024-04-29 PROCEDURE — G8420 CALC BMI NORM PARAMETERS: HCPCS | Performed by: NURSE PRACTITIONER

## 2024-04-29 PROCEDURE — G8427 DOCREV CUR MEDS BY ELIG CLIN: HCPCS | Performed by: NURSE PRACTITIONER

## 2024-04-29 PROCEDURE — 2022F DILAT RTA XM EVC RTNOPTHY: CPT | Performed by: NURSE PRACTITIONER

## 2024-04-29 PROCEDURE — G2211 COMPLEX E/M VISIT ADD ON: HCPCS | Performed by: NURSE PRACTITIONER

## 2024-04-29 PROCEDURE — 3044F HG A1C LEVEL LT 7.0%: CPT | Performed by: NURSE PRACTITIONER

## 2024-04-29 PROCEDURE — 99214 OFFICE O/P EST MOD 30 MIN: CPT | Performed by: NURSE PRACTITIONER

## 2024-04-29 PROCEDURE — 95251 CONT GLUC MNTR ANALYSIS I&R: CPT | Performed by: NURSE PRACTITIONER

## 2024-04-29 RX ORDER — INSULIN DEGLUDEC 200 U/ML
10 INJECTION, SOLUTION SUBCUTANEOUS DAILY
Qty: 1 ADJUSTABLE DOSE PRE-FILLED PEN SYRINGE | Refills: 0 | Status: SHIPPED | OUTPATIENT
Start: 2024-04-29

## 2024-04-29 ASSESSMENT — ENCOUNTER SYMPTOMS
DIARRHEA: 0
RESPIRATORY NEGATIVE: 1
ABDOMINAL PAIN: 0
SHORTNESS OF BREATH: 0

## 2024-04-29 NOTE — PROGRESS NOTES
[facility] OF [facility]  [address] [facility] [address] [phone]        HISTORY:    Marcellus Moncada presents today for evaluation and management of:  Chief Complaint   Patient presents with    Diabetes       No care team member to display      Interval History:    Past DM Medications   MDIx1 year      Current Diabetic Medications  novolog for use with omnipod max daily dose 40 units.      Office visit today is completed by Diabetes specialist NP, she has been using insulin pump (omnipod) for greater than 6 months with frequent changes in glucose and insulin levels. She is able to use omnipod safely and appropiratly.      DKA episodes: 0       01/29/24   Marcellus Moncada is a 23 y.o. female patient who presents to clinic today for her diabetes. she has a history of non compliance which contributes to her diabetes. At previous visit diabetes counseling was provided. she denies any current signs or symptoms of hyper/hypoglycemia. she states they are taking their medications as prescribed without any adverse effects. She is following with [facility] in [place] regarding some recent new onset of gi issues.   Diet: counting carbs  Exercise: none  BS testing: uses cgm daily with success and is adherent to cgm therapy  Hypoglycemia: No  Hypoglycemia as needed treatment: snack, juice or emergency glucagon.  Issues:   Diabetic foot exam up-to-date: Yes  Diabetic retinal exam up-to-date: Yes     Diabetes complications:none        04/29/24   Marcellus Moncada is a 24 y.o. female patient who presents to clinic today for her diabetes. she has a history of non compliance which contributes to her diabetes. At previous visit diabetes counseling was provided. she denies any current signs or symptoms of hyper/hypoglycemia. she states they are taking their medications as prescribed without any adverse effects. She is

## 2024-06-11 ENCOUNTER — OFFICE VISIT (OUTPATIENT)
Dept: OBGYN CLINIC | Age: 24
End: 2024-06-11
Payer: COMMERCIAL

## 2024-06-11 ENCOUNTER — HOSPITAL ENCOUNTER (OUTPATIENT)
Age: 24
Setting detail: SPECIMEN
Discharge: HOME | End: 2024-06-11

## 2024-06-11 VITALS
DIASTOLIC BLOOD PRESSURE: 58 MMHG | WEIGHT: 146 LBS | HEIGHT: 69 IN | BODY MASS INDEX: 21.62 KG/M2 | SYSTOLIC BLOOD PRESSURE: 96 MMHG

## 2024-06-11 DIAGNOSIS — N94.9 ADNEXAL FULLNESS: ICD-10-CM

## 2024-06-11 DIAGNOSIS — Z01.419 WELL WOMAN EXAM WITH ROUTINE GYNECOLOGICAL EXAM: ICD-10-CM

## 2024-06-11 DIAGNOSIS — Z01.419 WELL WOMAN EXAM WITH ROUTINE GYNECOLOGICAL EXAM: Primary | ICD-10-CM

## 2024-06-11 DIAGNOSIS — Q67.6 CONGENITAL PECTUS EXCAVATUM: ICD-10-CM

## 2024-06-11 PROCEDURE — 99395 PREV VISIT EST AGE 18-39: CPT | Performed by: OBSTETRICS & GYNECOLOGY

## 2024-06-11 NOTE — PROGRESS NOTES
History and Physical  [facility]  [facility] [address]  [place], [place]  [address] [phone]   Fax [phone]  Marcellus Moncada  2025              24 y.o.  Chief Complaint   Patient presents with    Established New Doctor    Annual Exam       Patient's last menstrual period was 2024 (exact date).             Primary Care Physician: No primary care provider on file.    The patient was seen and examined. She has no chief complaint today and is here for her annual exam.  Her bowels are regular. There are no voiding complaints. She denies any bloating.  She denies vaginal discharge and was counseled on STD's and the need for barrier contraception.     HPI : Marcellus Moncada is a 24 y.o. female     Pt presents to office for annual exam. Pt without c/c. Pt states periods are regular. Pt using condoms for birth control. 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   no Bloating  no Early Satiety  no Unexplained weight change of more than 15 lbs  no  PMB  no  PCB  ________________________________________________________________________  OB History    Para Term  AB Living   0 0 0 0 0 0   SAB IAB Ectopic Molar Multiple Live Births   0 0 0 0 0 0     Past Medical History:   Diagnosis Date    Diabetes mellitus (HCC)     Type 1 diabetes mellitus (HCC)                                                                    Past Surgical History:   Procedure Laterality Date    TONSILLECTOMY       Family History   Problem Relation Age of Onset    High Blood Pressure Paternal Grandmother     High Blood Pressure Paternal Grandfather      Social History     Socioeconomic History    Marital status: Single     Spouse name: Not on file    Number of children: Not on file    Years of education: Not on file    Highest education level: Not on file   Occupational History    Not on file   Tobacco Use    Smoking status: Never    Smokeless tobacco: Never

## 2024-06-12 DIAGNOSIS — E10.9 TYPE 1 DIABETES MELLITUS WITHOUT COMPLICATION (HCC): ICD-10-CM

## 2024-06-12 LAB
C TRACH DNA SPEC QL PROBE+SIG AMP: NEGATIVE
CANDIDA SPECIES: NEGATIVE
GARDNERELLA VAGINALIS: NEGATIVE
N GONORRHOEA DNA SPEC QL PROBE+SIG AMP: NEGATIVE
SOURCE: NORMAL
SPECIMEN DESCRIPTION: NORMAL
TRICHOMONAS: NEGATIVE

## 2024-06-13 RX ORDER — PROCHLORPERAZINE 25 MG/1
SUPPOSITORY RECTAL
Qty: 3 EACH | Refills: 5 | Status: SHIPPED | OUTPATIENT
Start: 2024-06-13

## 2024-06-20 ENCOUNTER — TELEPHONE (OUTPATIENT)
Dept: OBGYN CLINIC | Age: 24
End: 2024-06-20

## 2024-06-20 NOTE — TELEPHONE ENCOUNTER
----- Message from DESMOND Palacios CNP sent at 6/19/2024  2:07 PM EDT -----  Normal pelvic ultrasound  Please have DR Adair review for patient follow up and schedule accordingly

## 2024-06-20 NOTE — TELEPHONE ENCOUNTER
Per Roseanna NP, pt calling in for results.  Pt notified of US normal.  Pt declines f/u appt at this time.  Pt verbalized understanding.

## 2024-06-21 LAB — CYTOLOGY REPORT: NORMAL

## 2024-07-11 DIAGNOSIS — E10.9 TYPE 1 DIABETES MELLITUS WITHOUT COMPLICATION (HCC): ICD-10-CM

## 2024-07-11 RX ORDER — INSULIN PMP CART,AUT,G6/7,CNTR
EACH SUBCUTANEOUS
Qty: 10 EACH | Refills: 11 | Status: SHIPPED | OUTPATIENT
Start: 2024-07-11

## 2024-08-13 ENCOUNTER — OFFICE VISIT (OUTPATIENT)
Dept: INTERNAL MEDICINE | Age: 24
End: 2024-08-13

## 2024-08-13 VITALS
DIASTOLIC BLOOD PRESSURE: 70 MMHG | BODY MASS INDEX: 21.98 KG/M2 | WEIGHT: 145 LBS | HEART RATE: 84 BPM | HEIGHT: 68 IN | SYSTOLIC BLOOD PRESSURE: 110 MMHG | RESPIRATION RATE: 16 BRPM

## 2024-08-13 DIAGNOSIS — Z00.00 ENCOUNTER FOR PREVENTIVE HEALTH EXAMINATION: Primary | ICD-10-CM

## 2024-08-13 DIAGNOSIS — Z11.59 NEED FOR HEPATITIS C SCREENING TEST: ICD-10-CM

## 2024-08-13 DIAGNOSIS — Z11.4 SCREENING FOR HIV WITHOUT PRESENCE OF RISK FACTORS: ICD-10-CM

## 2024-08-13 DIAGNOSIS — E10.9 TYPE 1 DIABETES MELLITUS WITHOUT COMPLICATION (HCC): ICD-10-CM

## 2024-08-13 DIAGNOSIS — D69.6 THROMBOCYTOPENIA (HCC): ICD-10-CM

## 2024-08-13 SDOH — ECONOMIC STABILITY: FOOD INSECURITY: WITHIN THE PAST 12 MONTHS, YOU WORRIED THAT YOUR FOOD WOULD RUN OUT BEFORE YOU GOT MONEY TO BUY MORE.: NEVER TRUE

## 2024-08-13 SDOH — ECONOMIC STABILITY: FOOD INSECURITY: WITHIN THE PAST 12 MONTHS, THE FOOD YOU BOUGHT JUST DIDN'T LAST AND YOU DIDN'T HAVE MONEY TO GET MORE.: NEVER TRUE

## 2024-08-13 SDOH — ECONOMIC STABILITY: INCOME INSECURITY: HOW HARD IS IT FOR YOU TO PAY FOR THE VERY BASICS LIKE FOOD, HOUSING, MEDICAL CARE, AND HEATING?: NOT HARD AT ALL

## 2024-08-13 ASSESSMENT — ENCOUNTER SYMPTOMS
SHORTNESS OF BREATH: 0
DIARRHEA: 0
COUGH: 0
VOMITING: 0
RHINORRHEA: 0
WHEEZING: 0
NAUSEA: 0

## 2024-08-13 ASSESSMENT — PATIENT HEALTH QUESTIONNAIRE - PHQ9
SUM OF ALL RESPONSES TO PHQ QUESTIONS 1-9: 0
2. FEELING DOWN, DEPRESSED OR HOPELESS: NOT AT ALL
SUM OF ALL RESPONSES TO PHQ QUESTIONS 1-9: 0
SUM OF ALL RESPONSES TO PHQ9 QUESTIONS 1 & 2: 0
SUM OF ALL RESPONSES TO PHQ QUESTIONS 1-9: 0
1. LITTLE INTEREST OR PLEASURE IN DOING THINGS: NOT AT ALL
SUM OF ALL RESPONSES TO PHQ QUESTIONS 1-9: 0

## 2024-08-13 NOTE — PROGRESS NOTES
[facility] Internal Medicine  [address] [place] [address]  [phone]      Marcellus Moncada c/o of New Patient (No previous PCP)      HPI:     HPI    Patient presents to establish care and for preventative health care examination. Reviewed health maintenance and recommended vaccinations. Agreeable to HIV and hep C screening. Following with OBGYN for pap.    Following with Corine Saucedo NP, for type 1 diabetes.    Previous labs did note some mild thrombocytopenia at 130, discussed repeat with next labs for her diabetes.     Current Outpatient Medications   Medication Sig Dispense Refill    Continuous Glucose Sensor (DEXCOM G6 SENSOR) MISC CHANGE EVERY 10 DAYS 3 each 5    Insulin Degludec (TRESIBA FLEXTOUCH) 200 UNIT/ML SOPN Inject 10 Units into the skin daily 1 Adjustable Dose Pre-filled Pen Syringe 0    insulin aspart (NOVOLOG) 100 UNIT/ML injection vial USE WITH OMNIPOD, MAX DAILY DOSE OF 50 UNITS 20 mL 5    Continuous Blood Gluc Transmit (DEXCOM G6 TRANSMITTER) MISC USE DAILY TO CHECK BLOOD SUGARS. CHANGE EVERY 90 DAYS. 1 each 3    glucagon 1 MG injection Glucagon Emergency Kit 1 mg solution for injection      Insulin Disposable Pump (OMNIPOD 5 G6 PODS, GEN 5,) MISC USE,DAILYAND CHANGE POD EVERY 3 DAYS 10 each 11     No current facility-administered medications for this visit.     No Known Allergies    Health Maintenance   Topic Date Due    Pneumococcal 0-64 years Vaccine (1 of 2 - PCV) Never done    HPV vaccine (1 - 3-dose series) Never done    HIV screen  Never done    Hepatitis C screen  Never done    Flu vaccine (1) 09/13/2024 (Originally 8/1/2024)    COVID-19 Vaccine (3 - 2023-24 season) 08/13/2025 (Originally 9/1/2023)    Diabetic foot exam  09/27/2024    Diabetic Alb to Cr ratio (uACR) test  01/24/2025    A1C test (Diabetic or Prediabetic)  04/26/2025    Lipids  04/26/2025    GFR test (Diabetes, CKD 3-4, OR last GFR 15-59)  04/26/2025    Diabetic retinal exam  05/11/2025

## 2024-10-02 ENCOUNTER — OFFICE VISIT (OUTPATIENT)
Dept: DIABETES SERVICES | Age: 24
End: 2024-10-02
Payer: COMMERCIAL

## 2024-10-02 VITALS
HEART RATE: 82 BPM | DIASTOLIC BLOOD PRESSURE: 62 MMHG | BODY MASS INDEX: 21.92 KG/M2 | HEIGHT: 68 IN | OXYGEN SATURATION: 98 % | WEIGHT: 144.6 LBS | SYSTOLIC BLOOD PRESSURE: 100 MMHG

## 2024-10-02 DIAGNOSIS — E10.9 TYPE 1 DIABETES MELLITUS WITHOUT COMPLICATION (HCC): Primary | ICD-10-CM

## 2024-10-02 PROCEDURE — G8420 CALC BMI NORM PARAMETERS: HCPCS | Performed by: NURSE PRACTITIONER

## 2024-10-02 PROCEDURE — 99214 OFFICE O/P EST MOD 30 MIN: CPT | Performed by: NURSE PRACTITIONER

## 2024-10-02 PROCEDURE — G8427 DOCREV CUR MEDS BY ELIG CLIN: HCPCS | Performed by: NURSE PRACTITIONER

## 2024-10-02 PROCEDURE — G8484 FLU IMMUNIZE NO ADMIN: HCPCS | Performed by: NURSE PRACTITIONER

## 2024-10-02 PROCEDURE — 1036F TOBACCO NON-USER: CPT | Performed by: NURSE PRACTITIONER

## 2024-10-02 PROCEDURE — 2022F DILAT RTA XM EVC RTNOPTHY: CPT | Performed by: NURSE PRACTITIONER

## 2024-10-02 PROCEDURE — 3044F HG A1C LEVEL LT 7.0%: CPT | Performed by: NURSE PRACTITIONER

## 2024-10-02 PROCEDURE — G2211 COMPLEX E/M VISIT ADD ON: HCPCS | Performed by: NURSE PRACTITIONER

## 2024-10-02 PROCEDURE — 95251 CONT GLUC MNTR ANALYSIS I&R: CPT | Performed by: NURSE PRACTITIONER

## 2024-10-02 ASSESSMENT — ENCOUNTER SYMPTOMS
DIARRHEA: 0
RESPIRATORY NEGATIVE: 1
SHORTNESS OF BREATH: 0
ABDOMINAL PAIN: 0

## 2024-10-02 NOTE — PROGRESS NOTES
[facility]  [address] [facility] [address] [phone]        HISTORY:    Marcellus Moncada presents today for evaluation and management of:  Chief Complaint   Patient presents with    Diabetes       Patient Care Team:  Arlin Hare APRN - CNP as PCP - General (Family Nurse Practitioner)  Arlin Hare APRN - CNP as PCP - [facility]  Lucy Bucio DO as Consulting Physician (Obstetrics & Gynecology)      Interval History:    Past DM Medications   MDIx1 year      Current Diabetic Medications  novolog for use with omnipod max daily dose 40 units.      Office visit today is completed by Diabetes specialist NP, she has been using insulin pump (omnipod) for greater than 6 months with frequent changes in glucose and insulin levels. She is able to use omnipod safely and appropiratly.      DKA episodes: 0    04/29/24   Marcellus Moncada is a 24 y.o. female patient who presents to clinic today for her diabetes. she has a history of non compliance which contributes to her diabetes. At previous visit diabetes counseling was provided. she denies any current signs or symptoms of hyper/hypoglycemia. she states they are taking their medications as prescribed without any adverse effects. She is following with [facility] med doctor in [place] and will need recent labs faxed to them. She has had allergy testing and has been working on cutting out food intolerance.   Diet: counting carbs  Exercise: none  BS testing: uses cgm daily with success and is adherent to cgm therapy  Hypoglycemia: No  Hypoglycemia as needed treatment: snack, juice or emergency glucagon.  Issues:   Diabetic foot exam up-to-date: Yes  Diabetic retinal exam up-to-date: Yes     Diabetes complications:none     10/02/24   Marcellus Moncada is a 24 y.o. female patient who presents to clinic today for her diabetes. she has

## 2024-11-13 RX ORDER — INSULIN ASPART 100 [IU]/ML
INJECTION, SOLUTION INTRAVENOUS; SUBCUTANEOUS
Qty: 10 ML | Refills: 5 | Status: SHIPPED | OUTPATIENT
Start: 2024-11-13

## 2024-11-30 DIAGNOSIS — E10.9 TYPE 1 DIABETES MELLITUS WITHOUT COMPLICATION (HCC): ICD-10-CM

## 2024-12-02 RX ORDER — PROCHLORPERAZINE 25 MG/1
SUPPOSITORY RECTAL
Qty: 1 EACH | Refills: 5 | Status: SHIPPED | OUTPATIENT
Start: 2024-12-02

## 2024-12-14 DIAGNOSIS — E10.9 TYPE 1 DIABETES MELLITUS WITHOUT COMPLICATION (HCC): ICD-10-CM

## 2024-12-16 RX ORDER — PROCHLORPERAZINE 25 MG/1
SUPPOSITORY RECTAL
Qty: 3 EACH | Refills: 5 | Status: SHIPPED | OUTPATIENT
Start: 2024-12-16

## 2024-12-23 ENCOUNTER — TELEPHONE (OUTPATIENT)
Dept: INTERNAL MEDICINE | Age: 24
End: 2024-12-23

## 2025-01-07 ENCOUNTER — OFFICE VISIT (OUTPATIENT)
Dept: DIABETES SERVICES | Age: 25
End: 2025-01-07
Payer: COMMERCIAL

## 2025-01-07 VITALS
HEART RATE: 97 BPM | BODY MASS INDEX: 20.85 KG/M2 | WEIGHT: 137.6 LBS | HEIGHT: 68 IN | DIASTOLIC BLOOD PRESSURE: 62 MMHG | SYSTOLIC BLOOD PRESSURE: 104 MMHG | OXYGEN SATURATION: 99 %

## 2025-01-07 DIAGNOSIS — E10.9 TYPE 1 DIABETES MELLITUS WITHOUT COMPLICATION (HCC): Primary | ICD-10-CM

## 2025-01-07 LAB — HBA1C MFR BLD: 6.9 %

## 2025-01-07 PROCEDURE — 83036 HEMOGLOBIN GLYCOSYLATED A1C: CPT | Performed by: NURSE PRACTITIONER

## 2025-01-07 PROCEDURE — 95251 CONT GLUC MNTR ANALYSIS I&R: CPT | Performed by: NURSE PRACTITIONER

## 2025-01-07 PROCEDURE — G2211 COMPLEX E/M VISIT ADD ON: HCPCS | Performed by: NURSE PRACTITIONER

## 2025-01-07 PROCEDURE — 2022F DILAT RTA XM EVC RTNOPTHY: CPT | Performed by: NURSE PRACTITIONER

## 2025-01-07 PROCEDURE — G8420 CALC BMI NORM PARAMETERS: HCPCS | Performed by: NURSE PRACTITIONER

## 2025-01-07 PROCEDURE — 1036F TOBACCO NON-USER: CPT | Performed by: NURSE PRACTITIONER

## 2025-01-07 PROCEDURE — 99214 OFFICE O/P EST MOD 30 MIN: CPT | Performed by: NURSE PRACTITIONER

## 2025-01-07 PROCEDURE — M1308 PR FLU IMMUNIZE NO ADMIN: HCPCS | Performed by: NURSE PRACTITIONER

## 2025-01-07 PROCEDURE — 3044F HG A1C LEVEL LT 7.0%: CPT | Performed by: NURSE PRACTITIONER

## 2025-01-07 PROCEDURE — G8427 DOCREV CUR MEDS BY ELIG CLIN: HCPCS | Performed by: NURSE PRACTITIONER

## 2025-01-07 ASSESSMENT — ENCOUNTER SYMPTOMS
SHORTNESS OF BREATH: 0
RESPIRATORY NEGATIVE: 1

## 2025-01-07 NOTE — PROGRESS NOTES
CHOL 202 (H) 04/26/2024    TRIG 51 04/26/2024    HDL 82 04/26/2024     (H) 04/26/2024    VLDL 10 04/26/2024    CHOLHDLRATIO 2.0 04/26/2024     Lab Results   Component Value Date    ALT 37 (H) 04/26/2024    AST 31 04/26/2024    ALKPHOS 19 (L) 04/26/2024    BILITOT 0.5 04/26/2024             Attestation    The patient (or guardian, if applicable) and other individuals in attendance with the patient were advised that Artificial Intelligence will be utilized during this visit to record, process the conversation to generate a clinical note, and support improvement of the AI technology. The patient (or guardian, if applicable) and other individuals in attendance at the appointment consented to the use of AI, including the recording.            Electronically signed by DESMOND Villalba CNP on 1/7/2025 at 3:17 PM      (Please note that portions of this note were completed with a voice-recognition program. Efforts were made to edit the dictation but occasionally words are mis-transcribed.)

## 2025-03-26 ENCOUNTER — TELEPHONE (OUTPATIENT)
Dept: INTERNAL MEDICINE | Age: 25
End: 2025-03-26

## 2025-04-02 RX ORDER — INSULIN ASPART 100 [IU]/ML
INJECTION, SOLUTION INTRAVENOUS; SUBCUTANEOUS
Qty: 10 ML | Refills: 5 | Status: SHIPPED | OUTPATIENT
Start: 2025-04-02

## 2025-04-15 ENCOUNTER — OFFICE VISIT (OUTPATIENT)
Dept: DIABETES SERVICES | Age: 25
End: 2025-04-15
Payer: COMMERCIAL

## 2025-04-15 VITALS
HEART RATE: 76 BPM | DIASTOLIC BLOOD PRESSURE: 74 MMHG | OXYGEN SATURATION: 97 % | BODY MASS INDEX: 22.13 KG/M2 | SYSTOLIC BLOOD PRESSURE: 106 MMHG | HEIGHT: 68 IN | WEIGHT: 146 LBS

## 2025-04-15 DIAGNOSIS — E10.9 TYPE 1 DIABETES MELLITUS WITHOUT COMPLICATION: Primary | ICD-10-CM

## 2025-04-15 LAB — HBA1C MFR BLD: 6.9 %

## 2025-04-15 PROCEDURE — 83036 HEMOGLOBIN GLYCOSYLATED A1C: CPT | Performed by: NURSE PRACTITIONER

## 2025-04-15 PROCEDURE — 3044F HG A1C LEVEL LT 7.0%: CPT | Performed by: NURSE PRACTITIONER

## 2025-04-15 PROCEDURE — 95251 CONT GLUC MNTR ANALYSIS I&R: CPT | Performed by: NURSE PRACTITIONER

## 2025-04-15 PROCEDURE — 99214 OFFICE O/P EST MOD 30 MIN: CPT | Performed by: NURSE PRACTITIONER

## 2025-04-15 PROCEDURE — G2211 COMPLEX E/M VISIT ADD ON: HCPCS | Performed by: NURSE PRACTITIONER

## 2025-04-15 PROCEDURE — 2022F DILAT RTA XM EVC RTNOPTHY: CPT | Performed by: NURSE PRACTITIONER

## 2025-04-15 PROCEDURE — G8427 DOCREV CUR MEDS BY ELIG CLIN: HCPCS | Performed by: NURSE PRACTITIONER

## 2025-04-15 PROCEDURE — G8420 CALC BMI NORM PARAMETERS: HCPCS | Performed by: NURSE PRACTITIONER

## 2025-04-15 PROCEDURE — 1036F TOBACCO NON-USER: CPT | Performed by: NURSE PRACTITIONER

## 2025-04-15 ASSESSMENT — ENCOUNTER SYMPTOMS
RESPIRATORY NEGATIVE: 1
SHORTNESS OF BREATH: 0

## 2025-04-15 NOTE — PROGRESS NOTES
[facility]  [address] [facility] [address] [phone]        HISTORY:    Marcellus Moncada presents today for evaluation and management of:  Chief Complaint   Patient presents with    Diabetes       Patient Care Team:  Arlin Hare APRN - CNP as PCP - General (Family Nurse Practitioner)  Arlin Hare APRN - CNP as PCP [facility]  Lucy Bucio DO as Consulting Physician (Obstetrics & Gynecology)      Interval History:    Past DM Medications   MDIx1 year      Current Diabetic Medications  novolog for use with omnipod max daily dose 40 units.      Office visit today is completed by Diabetes specialist NP, she has been using insulin pump (omnipod) for greater than 6 months with frequent changes in glucose and insulin levels. She is able to use omnipod safely and appropiratly.      DKA episodes: 0      04/15/25   Marcellus Moncada is a 25 y.o. female patient who presents to clinic today for her diabetes.  At previous visit diabetes counseling was provided. she denies any current signs or symptoms of hyper/hypoglycemia. she states they are taking their medications as prescribed without any adverse effects.   Diet: counting carbs  Exercise: walking  BS testing: uses cgm daily with success and is adherent to cgm therapy  Hypoglycemia: No  Hypoglycemia as needed treatment: snack  Issues:   Diabetic foot exam up-to-date: Yes  Diabetic retinal exam up-to-date: Yes    Diabetes complications:none          Past Medical History:   Diagnosis Date    Diabetes mellitus (HCC)     Type 1 diabetes mellitus (HCC)      Family History   Problem Relation Age of Onset    High Blood Pressure Paternal Grandmother     High Blood Pressure Paternal Grandfather      Social History     Tobacco Use    Smoking status: Never    Smokeless tobacco: Never   Vaping Use    Vaping status: Never Used   Substance

## 2025-05-06 ENCOUNTER — OFFICE VISIT (OUTPATIENT)
Age: 25
End: 2025-05-06
Payer: COMMERCIAL

## 2025-05-06 VITALS
DIASTOLIC BLOOD PRESSURE: 65 MMHG | HEIGHT: 68 IN | OXYGEN SATURATION: 99 % | WEIGHT: 141 LBS | SYSTOLIC BLOOD PRESSURE: 101 MMHG | BODY MASS INDEX: 21.37 KG/M2 | TEMPERATURE: 98.2 F | HEART RATE: 99 BPM

## 2025-05-06 DIAGNOSIS — L70.0 ACNE VULGARIS: ICD-10-CM

## 2025-05-06 DIAGNOSIS — D22.9 MULTIPLE BENIGN NEVI: Primary | ICD-10-CM

## 2025-05-06 DIAGNOSIS — L84 CORN OF FOOT: ICD-10-CM

## 2025-05-06 PROCEDURE — 1036F TOBACCO NON-USER: CPT | Performed by: PHYSICIAN ASSISTANT

## 2025-05-06 PROCEDURE — G8420 CALC BMI NORM PARAMETERS: HCPCS | Performed by: PHYSICIAN ASSISTANT

## 2025-05-06 PROCEDURE — 99203 OFFICE O/P NEW LOW 30 MIN: CPT | Performed by: PHYSICIAN ASSISTANT

## 2025-05-06 PROCEDURE — G8427 DOCREV CUR MEDS BY ELIG CLIN: HCPCS | Performed by: PHYSICIAN ASSISTANT

## 2025-05-06 NOTE — PROGRESS NOTES
Dermatology Patient Note  [facility]  [facility] DERMATOLOGY  [address] [place] [address]  Dept: [phone]  Dept Fax: [phone]      VISITDATE: 5/6/2025   REFERRING PROVIDER: No ref. provider found      Marcellus Moncada is a 25 y.o. female  who presents today in the office for:    New Patient (Patient presents in the office today as a new patient for mole on her right side neck. She states a few months ago this was painful and hard to the touch but it no longer feels this way. She wants it looked at to make sure its not concerning. )      HISTORY OF PRESENT ILLNESS:  As above.  Pt also c/o premenstrual acne, but notes that her acne was previously much worse.  Left 4th toe has a tender lesion on the lateral aspect.    MEDICAL PROBLEMS:  Patient Active Problem List    Diagnosis Date Noted    Type 1 diabetes mellitus without complication ([facility]) 07/01/2021       CURRENT MEDICATIONS:   Current Outpatient Medications   Medication Sig Dispense Refill    insulin aspart (NOVOLOG) 100 UNIT/ML injection vial USE WITH OMNIPOD; MAX DAILY DOSE OF 50 UNITS 10 mL 5    Continuous Glucose Sensor (DEXCOM G6 SENSOR) MISC CHANGE EVERY 10 DAYS 3 each 5    Continuous Glucose Transmitter (DEXCOM G6 TRANSMITTER) MISC USE DAILY TO CHECK BLOOD SUGARS. CHANGE EVERY 90 DAYS. 1 each 5    Insulin Disposable Pump (OMNIPOD 5 G6 PODS, GEN 5,) MISC USE,DAILYAND CHANGE POD EVERY 3 DAYS 10 each 11    glucagon 1 MG injection Glucagon Emergency Kit 1 mg solution for injection      Insulin Degludec (TRESIBA FLEXTOUCH) 200 UNIT/ML SOPN Inject 10 Units into the skin daily (Patient not taking: Reported on 5/6/2025) 1 Adjustable Dose Pre-filled Pen Syringe 0     No current facility-administered medications for this visit.       ALLERGIES:   No Known Allergies    SOCIAL HISTORY:  Social History     Tobacco Use    Smoking status: Never    Smokeless tobacco: Never   Substance Use Topics    Alcohol use:

## 2025-07-09 DIAGNOSIS — E10.9 TYPE 1 DIABETES MELLITUS WITHOUT COMPLICATION (HCC): ICD-10-CM

## 2025-07-09 NOTE — TELEPHONE ENCOUNTER
Efren faxed requesting a refill of the below medication which has been pended for you:     Requested Prescriptions     Pending Prescriptions Disp Refills    Continuous Glucose Sensor (DEXCOM G6 SENSOR) MISC 3 each 5     Sig: CHANGE EVERY 10 DAYS       Last Appointment Date: 4/15/2025  Next Appointment Date: 7/15/2025    No Known Allergies

## 2025-07-10 RX ORDER — PROCHLORPERAZINE 25 MG/1
SUPPOSITORY RECTAL
Qty: 3 EACH | Refills: 5 | Status: SHIPPED | OUTPATIENT
Start: 2025-07-10

## 2025-07-12 ENCOUNTER — PATIENT MESSAGE (OUTPATIENT)
Dept: DIABETES SERVICES | Age: 25
End: 2025-07-12

## 2025-07-26 LAB
ALBUMIN/GLOBULIN RATIO: 1.6 G/DL
ALBUMIN: 4.2 G/DL (ref 3.5–5)
ALP BLD-CCNC: 34 UNITS/L (ref 38–126)
ALT SERPL-CCNC: 18 UNITS/L (ref 4–35)
ANION GAP SERPL CALCULATED.3IONS-SCNC: 7.7 MMOL/L (ref 3–11)
AST SERPL-CCNC: 27 UNITS/L (ref 14–36)
BILIRUB SERPL-MCNC: 0.5 MG/DL (ref 0.2–1.3)
BUN BLDV-MCNC: 16 MG/DL (ref 7–17)
CALCIUM SERPL-MCNC: 9.8 MG/DL (ref 8.4–10.2)
CHLORIDE BLD-SCNC: 105 MMOL/L (ref 98–120)
CHOLESTEROL, TOTAL: 171 MG/DL (ref 50–200)
CHOLESTEROL/HDL RATIO: 2.01 RATIO (ref 0–4.5)
CO2: 28 MMOL/L (ref 22–31)
CREAT SERPL-MCNC: 0.8 MG/DL (ref 0.5–1)
CREATININE, RANDOM URINE: 175.5 MG/DL (ref 20–370)
GFR, ESTIMATED: > 60
GLOBULIN: 2.6 G/DL
GLUCOSE: 117 MG/DL (ref 65–105)
HBA1C MFR BLD: 6.8 % (ref 4.4–6.4)
HDLC SERPL-MCNC: 85 MG/DL (ref 36–68)
LDL CHOLESTEROL: 74.2 MG/DL (ref 0–160)
MICROALBUMIN/CREAT 24H UR: 2.6 MG/DL (ref 0–1.7)
MICROALBUMIN/CREAT UR-RTO: 14.81
POTASSIUM SERPL-SCNC: 4.2 MMOL/L (ref 3.6–5)
SODIUM BLD-SCNC: 141 MMOL/L (ref 135–145)
TOTAL PROTEIN: 6.8 G/DL (ref 6.3–8.2)
TRIGL SERPL-MCNC: 59 MG/DL (ref 10–250)
VLDLC SERPL CALC-MCNC: 12 MG/DL (ref 0–50)

## 2025-07-27 LAB — MISCELLANEOUS LAB TEST RESULT: NORMAL

## 2025-08-05 ENCOUNTER — OFFICE VISIT (OUTPATIENT)
Dept: DIABETES SERVICES | Age: 25
End: 2025-08-05
Payer: COMMERCIAL

## 2025-08-05 VITALS
HEART RATE: 85 BPM | DIASTOLIC BLOOD PRESSURE: 66 MMHG | HEIGHT: 68 IN | BODY MASS INDEX: 22.88 KG/M2 | SYSTOLIC BLOOD PRESSURE: 100 MMHG | OXYGEN SATURATION: 100 % | WEIGHT: 151 LBS

## 2025-08-05 DIAGNOSIS — E10.9 TYPE 1 DIABETES MELLITUS WITHOUT COMPLICATION (HCC): Primary | ICD-10-CM

## 2025-08-05 PROCEDURE — 2022F DILAT RTA XM EVC RTNOPTHY: CPT | Performed by: NURSE PRACTITIONER

## 2025-08-05 PROCEDURE — G8427 DOCREV CUR MEDS BY ELIG CLIN: HCPCS | Performed by: NURSE PRACTITIONER

## 2025-08-05 PROCEDURE — 3044F HG A1C LEVEL LT 7.0%: CPT | Performed by: NURSE PRACTITIONER

## 2025-08-05 PROCEDURE — G8420 CALC BMI NORM PARAMETERS: HCPCS | Performed by: NURSE PRACTITIONER

## 2025-08-05 PROCEDURE — 1036F TOBACCO NON-USER: CPT | Performed by: NURSE PRACTITIONER

## 2025-08-05 PROCEDURE — 95251 CONT GLUC MNTR ANALYSIS I&R: CPT | Performed by: NURSE PRACTITIONER

## 2025-08-05 PROCEDURE — 99214 OFFICE O/P EST MOD 30 MIN: CPT | Performed by: NURSE PRACTITIONER

## 2025-08-05 PROCEDURE — G2211 COMPLEX E/M VISIT ADD ON: HCPCS | Performed by: NURSE PRACTITIONER

## 2025-08-05 RX ORDER — ACYCLOVIR 400 MG/1
1 TABLET ORAL
Qty: 9 EACH | Refills: 5 | Status: SHIPPED | OUTPATIENT
Start: 2025-08-05

## 2025-08-05 ASSESSMENT — ENCOUNTER SYMPTOMS
RESPIRATORY NEGATIVE: 1
SHORTNESS OF BREATH: 0

## 2025-08-18 RX ORDER — INSULIN ASPART 100 [IU]/ML
INJECTION, SOLUTION INTRAVENOUS; SUBCUTANEOUS
Qty: 10 ML | Refills: 5 | Status: SHIPPED | OUTPATIENT
Start: 2025-08-18

## 2025-08-29 DIAGNOSIS — E10.9 TYPE 1 DIABETES MELLITUS WITHOUT COMPLICATION (HCC): ICD-10-CM

## 2025-09-02 RX ORDER — INSULIN PMP CART,AUT,G6/7,CNTR
EACH SUBCUTANEOUS
Qty: 10 EACH | Refills: 11 | Status: SHIPPED | OUTPATIENT
Start: 2025-09-02